# Patient Record
Sex: FEMALE | NOT HISPANIC OR LATINO | URBAN - METROPOLITAN AREA
[De-identification: names, ages, dates, MRNs, and addresses within clinical notes are randomized per-mention and may not be internally consistent; named-entity substitution may affect disease eponyms.]

---

## 2018-05-15 ENCOUNTER — OFFICE VISIT CONVERTED (OUTPATIENT)
Dept: FAMILY MEDICINE CLINIC | Facility: CLINIC | Age: 38
End: 2018-05-15
Attending: NURSE PRACTITIONER

## 2020-07-01 ENCOUNTER — HOSPITAL ENCOUNTER (OUTPATIENT)
Dept: URGENT CARE | Facility: CLINIC | Age: 40
Discharge: HOME | End: 2020-07-01

## 2021-05-16 VITALS
DIASTOLIC BLOOD PRESSURE: 86 MMHG | OXYGEN SATURATION: 98 % | SYSTOLIC BLOOD PRESSURE: 128 MMHG | HEART RATE: 71 BPM | RESPIRATION RATE: 16 BRPM | BODY MASS INDEX: 45.99 KG/M2 | WEIGHT: 293 LBS | TEMPERATURE: 98.4 F | HEIGHT: 67 IN

## 2023-06-17 ENCOUNTER — HOSPITAL ENCOUNTER (EMERGENCY)
Facility: HOSPITAL | Age: 43
Discharge: HOME | End: 2023-06-17
Attending: EMERGENCY MEDICINE
Payer: MEDICAID

## 2023-06-17 ENCOUNTER — APPOINTMENT (OUTPATIENT)
Dept: GENERAL RADIOLOGY | Facility: HOSPITAL | Age: 43
End: 2023-06-17
Payer: MEDICAID

## 2023-06-17 ENCOUNTER — APPOINTMENT (OUTPATIENT)
Dept: CT IMAGING | Facility: HOSPITAL | Age: 43
End: 2023-06-17
Payer: MEDICAID

## 2023-06-17 VITALS
HEIGHT: 67 IN | HEART RATE: 75 BPM | BODY MASS INDEX: 45.95 KG/M2 | RESPIRATION RATE: 18 BRPM | TEMPERATURE: 98.5 F | SYSTOLIC BLOOD PRESSURE: 149 MMHG | DIASTOLIC BLOOD PRESSURE: 76 MMHG | OXYGEN SATURATION: 95 % | WEIGHT: 292.77 LBS

## 2023-06-17 DIAGNOSIS — M25.512 ACUTE PAIN OF LEFT SHOULDER: ICD-10-CM

## 2023-06-17 DIAGNOSIS — G44.309 POST-TRAUMATIC HEADACHE, NOT INTRACTABLE, UNSPECIFIED CHRONICITY PATTERN: ICD-10-CM

## 2023-06-17 DIAGNOSIS — M54.2 NECK PAIN: Primary | ICD-10-CM

## 2023-06-17 PROCEDURE — 70450 CT HEAD/BRAIN W/O DYE: CPT

## 2023-06-17 PROCEDURE — 72050 X-RAY EXAM NECK SPINE 4/5VWS: CPT

## 2023-06-17 RX ORDER — CHOLECALCIFEROL (VITAMIN D3) 125 MCG
500 CAPSULE ORAL DAILY
COMMUNITY

## 2023-06-17 RX ORDER — ACETAMINOPHEN 500 MG
1000 TABLET ORAL ONCE
Status: COMPLETED | OUTPATIENT
Start: 2023-06-17 | End: 2023-06-17

## 2023-06-17 RX ORDER — ERGOCALCIFEROL 1.25 MG/1
50000 CAPSULE ORAL WEEKLY
COMMUNITY

## 2023-06-17 RX ADMIN — ACETAMINOPHEN 1000 MG: 500 TABLET ORAL at 15:55

## 2023-06-17 NOTE — ED PROVIDER NOTES
Emergency Department Encounter    Date seen: 2023  Time: 3:22 PM EDT    Room number: 56/56    Chief Complaint: head and neck pain s/p fall      HPI    History of Present Illness:  Patient is a 43 y.o. year old female who presents to the emergency department for evaluation of fall. Pt was sitting on deck at a local store on when the leg of the chair fell through a hole on the deck.  Patient did not fall completely through the deck.  She reports striking the left side of her head very hard.  She had no LOC but states it did take her a minute to get up.  She presents today with left-sided head pain, neck pain, and left shoulder pain.  She describes her pain as an 8 on a scale of 0-10.    Independent Historian/Clinical History and Information was obtained by:   Patient    History is limited by: N/A      PCP: Provider, No Known        Past Medical History:     No Known Allergies  History reviewed. No pertinent past medical history.  Past Surgical History:   Procedure Laterality Date     SECTION       History reviewed. No pertinent family history.    Home Medications:  Prior to Admission medications    Not on File        Social History:   Social History     Tobacco Use    Smoking status: Never    Smokeless tobacco: Never   Substance Use Topics    Alcohol use: Not Currently    Drug use: Never       All X-rays impressions were independently interpreted by me.  CT scan radiology impression was interpreted by me.      Review of Systems:  Review of Systems   Constitutional:  Negative for chills and fever.   HENT:  Negative for congestion, ear pain and sore throat.    Eyes:  Negative for pain.   Respiratory:  Negative for cough, chest tightness and shortness of breath.    Cardiovascular:  Negative for chest pain.   Gastrointestinal:  Negative for abdominal pain, diarrhea, nausea and vomiting.   Genitourinary:  Negative for flank pain and hematuria.   Musculoskeletal:  Positive for arthralgias (Left shoulder pain  "left hip pain) and neck pain. Negative for joint swelling.   Skin:  Negative for pallor.   Neurological:  Negative for seizures, syncope and headaches.   All other systems reviewed and are negative.     Physical Exam:  /76 (BP Location: Right arm, Patient Position: Sitting)   Pulse 75   Temp 98.5 °F (36.9 °C) (Oral)   Resp 18   Ht 170.2 cm (67\")   Wt 133 kg (292 lb 12.3 oz)   SpO2 95%   BMI 45.85 kg/m²     Physical Exam  Vitals and nursing note reviewed.   Constitutional:       General: She is not in acute distress.     Appearance: Normal appearance. She is not toxic-appearing.   HENT:      Head: Normocephalic and atraumatic.      Mouth/Throat:      Mouth: Mucous membranes are moist.   Eyes:      General: No scleral icterus.  Cardiovascular:      Rate and Rhythm: Normal rate and regular rhythm.      Pulses: Normal pulses.      Heart sounds: Normal heart sounds.   Pulmonary:      Effort: Pulmonary effort is normal. No respiratory distress.      Breath sounds: Normal breath sounds. No stridor. No wheezing, rhonchi or rales.   Chest:      Chest wall: No tenderness.   Abdominal:      General: Abdomen is flat.      Palpations: Abdomen is soft.      Tenderness: There is no abdominal tenderness.   Musculoskeletal:         General: Tenderness (Left shoulder) present. No swelling, deformity or signs of injury. Normal range of motion.      Cervical back: Normal range of motion and neck supple.   Skin:     General: Skin is warm and dry.      Coloration: Skin is not jaundiced or pale.      Findings: Abrasion (Left elbow and knee) present. No bruising, erythema, lesion or rash.      Comments: Abrasion to left elbow and left knee   Neurological:      Mental Status: She is alert and oriented to person, place, and time. Mental status is at baseline.      Sensory: No sensory deficit.                Procedures:  Procedures      Medical Decision Making:      Comorbidities that affect care:    Obesity    External Notes " reviewed:          The following orders were placed and all results were independently analyzed by me:  Orders Placed This Encounter   Procedures    CT Head Without Contrast    XR Spine Cervical Complete 4 or 5 View       Medications Given in the Emergency Department:  Medications   acetaminophen (TYLENOL) tablet 1,000 mg (1,000 mg Oral Given 6/17/23 1555)        ED Course:         Labs:    Lab Results (last 24 hours)       ** No results found for the last 24 hours. **             Imaging:    XR Spine Cervical Complete 4 or 5 View    Result Date: 6/17/2023  PROCEDURE: XR SPINE CERVICAL COMPLETE 4 OR 5 VW  COMPARISON: [facility], CR, C SPINE >OR= 4V OBLIQUE, 7/02/2013, 8:37.  INDICATIONS: fall through deck - pain worse on left side.  FINDINGS:  No fracture or malalignment is identified.  The bony neural foramina appear widely patent bilaterally.  Prevertebral soft tissues appear normal.        1. No acute fracture or malalignment     Haresh Penn M.D.       Electronically Signed and Approved By: Haresh Penn M.D. on 6/17/2023 at 16:20             CT Head Without Contrast    Result Date: 6/17/2023  PROCEDURE: CT HEAD WO CONTRAST  COMPARISON:  [facility], CT, ABDOMEN/PELVIS WITH CONTRAST, 12/27/2017, 2:17. INDICATIONS: fall through deck/HEAD PAIN  PROTOCOL:   Standard imaging protocol performed    RADIATION:   DLP: 954.7 mGy*cm   MA and/or KV was adjusted to minimize radiation dose.     TECHNIQUE: After obtaining the patient's consent, CT images were obtained without non-ionic intravenous contrast material.  FINDINGS:  No focal brain lesion, mass or intracranial hemorrhage is identified.  The ventricles and cisterns are normal in size and configuration.  The visualized extracranial soft tissues appear normal.  Mucosal inflammatory changes are noted in the ethmoid sinuses bilaterally.  No calvarial fracture is identified.        1. No acute intracranial abnormality     Haresh Penn M.D.        Electronically Signed and Approved By: Haresh Penn M.D. on 6/17/2023 at 16:08                Differential Diagnosis and Discussion:    Extremity Pain: Differential diagnosis includes but is not limited to soft tissue sprain, tendonitis, tendon injury, dislocation, fracture, deep vein thrombosis, arterial insufficiency, osteoarthritis, bursitis, and ligamentous damage.  Neck Pain: The patient presents with neck pain. My differential diagnosis includes but is not limited to acute spinal epidural abscess, acute spinal epidural bleed, meningitis, musculoskeletal neck pain, spinal fracture, and osteoarthritis.         Patient Care Considerations:    NARCOTICS: I considered prescribing opiate pain medication as an outpatient, however patient's pain was easily controlled with NSAIDs.  Additionally she was encouraged to use the RICE acronym.      Consultants/Shared Management Plan:    None    Social Determinants of Health:    Patient is independent, reliable, and has access to care.       Disposition and Care Coordination:    Discharged: The patient is suitable and stable for discharge with no need for consideration of observation or admission.    I have explained the patient´s condition, diagnoses and treatment plan based on the information available to me at this time. I have answered questions and addressed any concerns. The patient has a good  understanding of the patient´s diagnosis, condition, and treatment plan as can be expected at this point. The vital signs have been stable. The patient´s condition is stable and appropriate for discharge from the emergency department.      The patient will pursue further outpatient evaluation with the primary care physician or other designated or consulting physician as outlined in the discharge instructions. They are agreeable to this plan of care and follow-up instructions have been explained in detail. The patient has received these instructions in written format and have  expressed an understanding of the discharge instructions. The patient is aware that any significant change in condition or worsening of symptoms should prompt an immediate return to this or the closest emergency department or call to 911.    MDM  Number of Diagnoses or Management Options  Acute pain of left shoulder: new and does not require workup  Neck pain: new and does not require workup  Post-traumatic headache, not intractable, unspecified chronicity pattern: new and does not require workup     Amount and/or Complexity of Data Reviewed  Tests in the radiology section of CPT®: ordered and reviewed    Risk of Complications, Morbidity, and/or Mortality  Presenting problems: low  Diagnostic procedures: low  Management options: low    Patient Progress  Patient progress: stable       Final diagnoses:   Neck pain   Post-traumatic headache, not intractable, unspecified chronicity pattern   Acute pain of left shoulder        ED Disposition       ED Disposition   Discharge    Condition   Stable    Comment   --               This medical record created using voice recognition software.    Documentation assistance provided by Harsh Montenegro, acting as scribe for JANET Cavazos. Information recorded by the scribe was done at my direction and has been verified and validated by me.                    Harsh Montenegro  06/17/23 0950       Anahi Villalpando APRN  06/17/23 0268

## 2023-06-17 NOTE — DISCHARGE INSTRUCTIONS
Please know that your x-rays today were all within normal limits.  It is likely all your pain is muscle soreness.  Please alternate Tylenol and Motrin as needed for your pain.  You may also adhere to the RICE acronym as outlined in your discharge instructions for additional comfort measures.  If it anytime you develop any numbness or tingling to extremities, become unable to ambulate, develop the worst headache of your life please return to the emergency department otherwise follow-up with your primary care provider.

## 2023-07-07 PROBLEM — J30.9 ALLERGIC RHINITIS: Status: ACTIVE | Noted: 2023-07-07

## 2023-07-07 PROBLEM — J45.909 ASTHMA: Status: ACTIVE | Noted: 2023-07-07

## 2023-09-22 ENCOUNTER — OFFICE VISIT (OUTPATIENT)
Dept: FAMILY MEDICINE CLINIC | Facility: CLINIC | Age: 43
End: 2023-09-22
Payer: MEDICAID

## 2023-09-22 VITALS
HEART RATE: 51 BPM | BODY MASS INDEX: 45.99 KG/M2 | TEMPERATURE: 97.7 F | DIASTOLIC BLOOD PRESSURE: 88 MMHG | OXYGEN SATURATION: 97 % | SYSTOLIC BLOOD PRESSURE: 138 MMHG | HEIGHT: 67 IN | WEIGHT: 293 LBS

## 2023-09-22 DIAGNOSIS — Z23 NEED FOR TDAP VACCINATION: ICD-10-CM

## 2023-09-22 DIAGNOSIS — E55.9 VITAMIN D DEFICIENCY: ICD-10-CM

## 2023-09-22 DIAGNOSIS — Z23 NEED FOR INFLUENZA VACCINATION: ICD-10-CM

## 2023-09-22 DIAGNOSIS — Z00.00 ANNUAL PHYSICAL EXAM: Primary | ICD-10-CM

## 2023-09-22 DIAGNOSIS — Z23 NEED FOR PNEUMOCOCCAL VACCINE: ICD-10-CM

## 2023-09-22 DIAGNOSIS — E53.8 B12 DEFICIENCY: ICD-10-CM

## 2023-09-22 DIAGNOSIS — Z76.89 ENCOUNTER FOR SUPPORT AND COORDINATION OF TRANSITION OF CARE: ICD-10-CM

## 2023-09-22 DIAGNOSIS — L30.9 ECZEMA, UNSPECIFIED TYPE: ICD-10-CM

## 2023-09-22 DIAGNOSIS — Z12.31 ENCOUNTER FOR SCREENING MAMMOGRAM FOR MALIGNANT NEOPLASM OF BREAST: ICD-10-CM

## 2023-09-22 DIAGNOSIS — I10 ESSENTIAL HYPERTENSION: ICD-10-CM

## 2023-09-22 DIAGNOSIS — E66.01 CLASS 3 SEVERE OBESITY DUE TO EXCESS CALORIES WITH SERIOUS COMORBIDITY AND BODY MASS INDEX (BMI) OF 45.0 TO 49.9 IN ADULT: ICD-10-CM

## 2023-09-22 RX ORDER — CLOBETASOL PROPIONATE 0.5 MG/ML
LOTION TOPICAL 2 TIMES DAILY
Qty: 118 ML | Refills: 1 | Status: SHIPPED | OUTPATIENT
Start: 2023-09-22 | End: 2023-09-22 | Stop reason: SDUPTHER

## 2023-09-22 RX ORDER — CLOBETASOL PROPIONATE 0.5 MG/G
1 CREAM TOPICAL 2 TIMES DAILY
Qty: 60 G | Refills: 0 | Status: SHIPPED | OUTPATIENT
Start: 2023-09-22

## 2023-09-22 NOTE — PROGRESS NOTES
Chief Complaint  Chief Complaint   Patient presents with    Establish Care     Transfer of care from Delbert GONZALES    Hypertension    Eczema     Pt was given cream at last visit for eczema on elbows, it has not helped    Obesity       Subjective      Jessenia Platt presents to [facility]    The patient is a 43-year-old female who presents today for transition of care from previous primary care clinician and for follow-up on hypertension and eczema.    The patients medical history is significant for vitamin B12 and vitamin D deficiencies, high blood pressure, and possible insulin resistance or prediabetes in the past. She was told in 10/2022, that she was insulin resistant; however, when she was seen by Gama GONZALES, he completed laboratory testing that revealed that she was not insulin resistant. She had made some lifestyle changes following her diagnosis in 10/2022 which she feels may have made the difference. Her most recent hemoglobin A1c was 5.6 percent.     She has received vitamin B12 injections weekly in the past with her most recent being approximately 3 months ago. She was also taking vitamin D supplements weekly in the past as well. She adds that in addition to the injections and weekly supplements, she was also taking drops of both vitamin B12 and vitamin D daily in the past.     Her previous clinician was administering hormone injections once per month in the past. She was also being treated for weight loss, but was unable to lose any weight. She is concerned about her weight due to working out in the gym 5 days per week working on cardio and weight lifting without seeing any results. She has also changed her dietary habits recently. She practices juicing in the morning for her first daily intake and she eats healthy salads throughout the day. She drinks plenty of water throughout the day and has completely cut out all soda intake. She uses [employer] to  help monitor her caloric intake. She feels that there are times that she does not eat enough due to her job, which is what got her started with juicing and protein shakes. She eats dinner at approximately 7:00 PM after work and has been focusing on not eating past that time. She has been practicing meal prep for her dinners. She was prescribed metformin in the past and was able to lose approximately 20 pounds; however, she did not like how the medication made her feel.     She has never undergone a mammogram and she is past due on her annual Pap smear. She denies ever having an abnormal Pap smear result.     She was seen for eczema in the past and was prescribed triamcinolone cream that she does not feel has improved her symptoms. She localizes the majority of her symptoms to her bilateral elbows. She denies itching. She notes that her symptoms began at the age of approximately 38 years of age. The areas in concern with intermittently resolve on its own and then return.     She denies being prescribed medication for blood pressure currently. Her blood pressure today is 138/88 mmHg. She was prescribed medication for this issue several years ago.     Objective     Medical History:  Past Medical History:   Diagnosis Date    Allergic      Past Surgical History:   Procedure Laterality Date     SECTION        Social History     Tobacco Use    Smoking status: Former     Packs/day: 0.10     Years: 10.00     Pack years: 1.00     Types: Cigarettes     Quit date:      Years since quittin.7    Smokeless tobacco: Never   Vaping Use    Vaping Use: Never used   Substance Use Topics    Alcohol use: Not Currently    Drug use: Never     History reviewed. No pertinent family history.    Medications:  Prior to Admission medications    Medication Sig Start Date End Date Taking? Authorizing Provider   triamcinolone (KENALOG) 0.1 % cream Apply 1 application  topically to the appropriate area as directed 2 (Two) Times a  "Day. 7/7/23   Gama Ayers APRN        Allergies:   Patient has no known allergies.    Health Maintenance Due   Topic Date Due    Pneumococcal Vaccine 0-64 (1 - PCV) Never done    TDAP/TD VACCINES (1 - Tdap) Never done    COVID-19 Vaccine (3 - Pfizer series) 06/30/2021    ANNUAL PHYSICAL  Never done    PAP SMEAR  07/07/2023         Vital Signs:   /88 (BP Location: Left arm, Patient Position: Sitting, Cuff Size: Adult)   Pulse 51   Temp 97.7 °F (36.5 °C) (Oral)   Ht 170.2 cm (67\")   Wt 135 kg (298 lb)   SpO2 97%   BMI 46.67 kg/m²     Wt Readings from Last 3 Encounters:   09/22/23 135 kg (298 lb)   07/07/23 132 kg (292 lb)   06/17/23 133 kg (292 lb 12.3 oz)     BP Readings from Last 3 Encounters:   09/22/23 138/88   07/07/23 142/90   06/17/23 149/76     Physical Exam  Vitals reviewed.   Constitutional:       Appearance: Normal appearance. She is well-developed. She is morbidly obese.   HENT:      Head: Normocephalic and atraumatic.   Eyes:      Conjunctiva/sclera: Conjunctivae normal.      Pupils: Pupils are equal, round, and reactive to light.   Cardiovascular:      Rate and Rhythm: Normal rate and regular rhythm.      Heart sounds: No murmur heard.    No friction rub. No gallop.   Pulmonary:      Effort: Pulmonary effort is normal.      Breath sounds: Normal breath sounds. No wheezing or rhonchi.   Abdominal:      General: Bowel sounds are normal. There is no distension.      Palpations: Abdomen is soft.      Tenderness: There is no abdominal tenderness.   Skin:     General: Skin is warm and dry.      Findings: Rash present. Rash is scaling.   Neurological:      Mental Status: She is alert and oriented to person, place, and time.      Cranial Nerves: No cranial nerve deficit.   Psychiatric:         Mood and Affect: Mood and affect normal.         Behavior: Behavior normal.         Thought Content: Thought content normal.         Judgment: Judgment normal.         Result Review :    The following data " was reviewed by JANET Donahue on 09/22/23 at 07:53 EDT:    Common labs          7/7/2023    08:25   Common Labs   Glucose 131    BUN 17    Creatinine 0.63    Sodium 141    Potassium 4.2    Chloride 108    Calcium 9.6    Albumin 4.4    Total Bilirubin 0.3    Alkaline Phosphatase 81    AST (SGOT) 21    ALT (SGPT) 37    WBC 7.35    Hemoglobin 14.4    Hematocrit 42.6    Platelets 235    Total Cholesterol 200    Triglycerides 141    HDL Cholesterol 45    LDL Cholesterol  130    Hemoglobin A1C 5.60      Most Recent A1C          7/7/2023    08:25   HGBA1C Most Recent   Hemoglobin A1C 5.60        XR Spine Cervical Complete 4 or 5 View    Result Date: 6/17/2023    1. No acute fracture or malalignment     Haresh Penn M.D.       Electronically Signed and Approved By: Haresh Penn M.D. on 6/17/2023 at 16:20             CT Head Without Contrast    Result Date: 6/17/2023    1. No acute intracranial abnormality     Haresh Penn M.D.       Electronically Signed and Approved By: Haresh Penn M.D. on 6/17/2023 at 16:08                            Assessment and Plan    Diagnoses and all orders for this visit:    1. Annual physical exam (Primary)    2. Encounter for support and coordination of transition of care    3. Essential hypertension    4. B12 deficiency    5. Vitamin D deficiency    6. Class 3 severe obesity due to excess calories with serious comorbidity and body mass index (BMI) of 45.0 to 49.9 in adult    7. Eczema, unspecified type  -     clobetasol (CLOBEX) 0.05 % lotion; Apply  topically to the appropriate area as directed 2 (Two) Times a Day.  Dispense: 118 mL; Refill: 1    8. Encounter for screening mammogram for malignant neoplasm of breast  -     Mammo Screening Digital Tomosynthesis Bilateral With CAD; Future    9. Need for pneumococcal vaccine  -     Cancel: Pneumococcal Conjugate Vaccine 20-Valent (PCV20)  -     Pneumococcal Conjugate Vaccine 20-Valent (PCV20); Future    10. Need for influenza  vaccination  -     Fluzone >6 Months ([id number])    11. Need for Tdap vaccination  -     Tdap Vaccine => 6yo IM (BOOSTRIX)       Obesity  - We spent time discussing her dietary and exercise habits and routine. I recommended she keep a consistent caloric intake log throughout the day. We discussed the possibility of restarting metformin if she feels that would be helpful to her.     General medical examination  - I have placed an order for a mammogram screening. Influenza and Tdap vaccines administered today. She will schedule a follow-up examination in 3 months. She will consider completing her annual Pap smear at that appointment.     Eczema  - I have sent in a prescription for clobetasol lotion for the patient to apply to the affected areas 2 times a day as needed.         Smoking Cessation:    Jessenia Platt  reports that she quit smoking about 21 years ago. Her smoking use included cigarettes. She has a 1.00 pack-year smoking history. She has never used smokeless tobacco.    Follow Up   Return in about 3 months (around 12/22/2023) for Next scheduled follow up, [facility] with Pap.  Patient was given instructions and counseling regarding her condition or for health maintenance advice. Please see specific information pulled into the AVS if appropriate.     Please note that portions of this note were completed with a voice recognition program.    Transcribed from ambient dictation for JANET Donahue by Filomena Jones.  09/22/23   10:18 EDT    Patient or patient representative verbalized consent to the visit recording.  I have personally performed the services described in this document as transcribed by the above individual, and it is both accurate and complete.

## 2023-11-22 ENCOUNTER — HOSPITAL ENCOUNTER (EMERGENCY)
Facility: HOSPITAL | Age: 43
Discharge: HOME | End: 2023-11-22
Attending: EMERGENCY MEDICINE | Admitting: EMERGENCY MEDICINE
Payer: MEDICAID

## 2023-11-22 VITALS
TEMPERATURE: 98.1 F | SYSTOLIC BLOOD PRESSURE: 155 MMHG | HEIGHT: 67 IN | RESPIRATION RATE: 20 BRPM | WEIGHT: 293 LBS | OXYGEN SATURATION: 98 % | HEART RATE: 55 BPM | BODY MASS INDEX: 45.99 KG/M2 | DIASTOLIC BLOOD PRESSURE: 77 MMHG

## 2023-11-22 DIAGNOSIS — J02.9 ACUTE VIRAL PHARYNGITIS: Primary | ICD-10-CM

## 2023-11-22 DIAGNOSIS — I10 UNCONTROLLED HYPERTENSION: ICD-10-CM

## 2023-11-22 LAB
FLUAV SUBTYP SPEC NAA+PROBE: NOT DETECTED
FLUBV RNA ISLT QL NAA+PROBE: NOT DETECTED
HETEROPH AB SER QL LA: NEGATIVE
RSV RNA NPH QL NAA+NON-PROBE: NOT DETECTED
S PYO AG THROAT QL: NEGATIVE
SARS-COV-2 RNA RESP QL NAA+PROBE: NOT DETECTED

## 2023-11-22 PROCEDURE — 99283 EMERGENCY DEPT VISIT LOW MDM: CPT

## 2023-11-22 PROCEDURE — 87637 SARSCOV2&INF A&B&RSV AMP PRB: CPT | Performed by: EMERGENCY MEDICINE

## 2023-11-22 PROCEDURE — 87880 STREP A ASSAY W/OPTIC: CPT | Performed by: EMERGENCY MEDICINE

## 2023-11-22 PROCEDURE — 36415 COLL VENOUS BLD VENIPUNCTURE: CPT

## 2023-11-22 PROCEDURE — 87081 CULTURE SCREEN ONLY: CPT | Performed by: EMERGENCY MEDICINE

## 2023-11-22 PROCEDURE — 86308 HETEROPHILE ANTIBODY SCREEN: CPT | Performed by: EMERGENCY MEDICINE

## 2023-11-22 RX ORDER — IBUPROFEN 400 MG/1
800 TABLET ORAL ONCE
Status: COMPLETED | OUTPATIENT
Start: 2023-11-22 | End: 2023-11-22

## 2023-11-22 RX ADMIN — IBUPROFEN 800 MG: 400 TABLET, FILM COATED ORAL at 07:17

## 2023-11-22 NOTE — ED PROVIDER NOTES
Time: 6:15 AM EST  Date of encounter:  2023  Independent Historian/Clinical History and Information was obtained by:   Patient    History is limited by: N/A    Chief Complaint: Sore throat, ear ache      History of Present Illness:  Patient is a 43 y.o. year old female who presents to the emergency department for evaluation of moderately severe sore throat and bilateral earache recently over the past couple days.    She denies any fever or cough or headache or chills.    She denies any known sick contacts with COVID-19.    She does have some generalized malaise and some bodyaches though and feels rundown the last couple days.    HPI    Patient Care Team  Primary Care Provider: Maris Gordillo APRN    Past Medical History:   Hypertension, eczema  No Known Allergies  Past Medical History:   Diagnosis Date    Allergic      Past Surgical History:   Procedure Laterality Date     SECTION       History reviewed. No pertinent family history.    Home Medications:  Prior to Admission medications    Medication Sig Start Date End Date Taking? Authorizing Provider   clobetasol prop emollient base (Clobetasol Propionate E) 0.05 % emollient cream Apply 1 application  topically to the appropriate area as directed 2 (Two) Times a Day. 23   Maris Gordillo APRN   triamcinolone (KENALOG) 0.1 % cream Apply 1 application  topically to the appropriate area as directed 2 (Two) Times a Day. 23   Gama Ayers APRN        Social History:   Social History     Tobacco Use    Smoking status: Former     Packs/day: 0.10     Years: 10.00     Additional pack years: 0.00     Total pack years: 1.00     Types: Cigarettes     Quit date:      Years since quittin.9    Smokeless tobacco: Never   Vaping Use    Vaping Use: Never used   Substance Use Topics    Alcohol use: Not Currently    Drug use: Never         Review of Systems:  Review of Systems     I performed a 10 point review of systems which was  "all negative, except for the positives found in the HPI above.        Physical Exam:  BP (!) 182/98 (BP Location: Left arm, Patient Position: Sitting)   Pulse 68   Temp 98.1 °F (36.7 °C) (Oral)   Resp 20   Ht 170.2 cm (67\")   Wt 133 kg (294 lb 1.5 oz)   SpO2 98%   BMI 46.06 kg/m²       Physical Exam   General: Awake alert and in mild distress due to throat pain    HEENT: Head normocephalic atraumatic, eyes PERRLA EOMI, nose normal, oropharynx notable for mild posterior pharynx erythema bilaterally but no tonsillar enlargement or exudates or uvular midline shift or ulcerations.  Voice sounds somewhat hoarse.  Bilateral ear exam showed normal canals and normal TMs without erythema or bulging or drainage.    Neck: Supple full range of motion, no meningismus, minimal bilateral anterior cervical lymphadenopathy    Heart: Regular rate and rhythm, no murmurs or rubs, 2+ radial pulses bilaterally    Lungs: Clear to auscultation bilaterally without wheezes or crackles, no respiratory distress    Abdomen: Soft, nontender, nondistended, no rebound or guarding    Skin: Warm, dry, no rash    Musculoskeletal: Normal range of motion, no lower extremity edema    Neurologic: Oriented x3, no motor deficits no sensory deficits    Psychiatric: Mood appears stable, no psychosis          Procedures:  Procedures      Medical Decision Making:      Comorbidities that affect care:    Hypertension    External Notes reviewed:    Previous Clinic Note: I reviewed her most recent primary care clinic note where she was being managed for hypertension and obesity and eczema      The following orders were placed and all results were independently analyzed by me:  Orders Placed This Encounter   Procedures    COVID-19, FLU A/B, RSV PCR 1 HR TAT - Swab, Nasopharynx    Rapid Strep A Screen - Swab, Throat    Beta Strep Culture, Throat - Swab, Throat    Mononucleosis Screen       Medications Given in the Emergency Department:  Medications "   ibuprofen (ADVIL,MOTRIN) tablet 800 mg (800 mg Oral Given 11/22/23 0717)        ED Course:         Labs:    Lab Results (last 24 hours)       Procedure Component Value Units Date/Time    COVID-19, FLU A/B, RSV PCR 1 HR TAT - Swab, Nasopharynx [[id number]]  (Normal) Collected: 11/22/23 0609    Specimen: Swab from Nasopharynx Updated: 11/22/23 0705     COVID19 Not Detected     Influenza A PCR Not Detected     Influenza B PCR Not Detected     RSV, PCR Not Detected    Narrative:      Fact sheet for providers: https://www.fda.gov/media/892908/download    Fact sheet for patients: https://www.fda.gov/media/206212/download    Test performed by PCR.    Rapid Strep A Screen - Swab, Throat [[id number]]  (Normal) Collected: 11/22/23 0609    Specimen: Swab from Throat Updated: 11/22/23 0648     Strep A Ag Negative    Beta Strep Culture, Throat - Swab, Throat [[id number]] Collected: 11/22/23 0609    Specimen: Swab from Throat Updated: 11/22/23 0648    Mononucleosis Screen [[id number]]  (Normal) Collected: 11/22/23 0723    Specimen: Blood Updated: 11/22/23 0747     Monospot Negative             Imaging:    No Radiology Exams Resulted Within Past 24 Hours      Differential Diagnosis and Discussion:    Sore Throat: Differential diagnosis includes but is not limited to bacterial infection, viral infection, inhaled irritants, sinus drainage, thyroiditis, epiglottitis, and retropharyngeal abscess.    All labs were reviewed and interpreted by me.    MDM     Amount and/or Complexity of Data Reviewed  Clinical lab tests: reviewed               This patient is a 43-year-old female presenting with acute onset of sore throat and earache.    Vital signs are stable except blood pressure is poorly controlled, but she does have a history of hypertension.    I am swabbing her for strep throat and also COVID and flu virus, and will send blood work for Monospot testing.    All of her swabs for COVID and flu and RSV and strep throat and even her  monotest came back negative today, so I presume she has a self-limiting viral pharyngitis.      I do not see any airway involvement or any likelihood of peritonsillar abscess or deep space infection in the neck and I think she probably has a self-limiting viral pharyngitis that can be managed safely as an outpatient with supportive care instructions like NSAIDs, salt water gargle, Cepacol lozenges.                Patient Care Considerations:    ANTIBIOTICS: I considered prescribing antibiotics as an outpatient however she appears to have a viral pharyngitis that should be self-limiting      Consultants/Shared Management Plan:        Social Determinants of Health:    Patient is independent, reliable, and has access to care.       Disposition and Care Coordination:    Discharged: The patient is suitable and stable for discharge with no need for consideration of observation or admission.    I have explained the patient´s condition, diagnoses and treatment plan based on the information available to me at this time. I have answered questions and addressed any concerns. The patient has a good  understanding of the patient´s diagnosis, condition, and treatment plan as can be expected at this point. The vital signs have been stable. The patient´s condition is stable and appropriate for discharge from the emergency department.      The patient will pursue further outpatient evaluation with the primary care physician or other designated or consulting physician as outlined in the discharge instructions. They are agreeable to this plan of care and follow-up instructions have been explained in detail. The patient has received these instructions in written format and have expressed an understanding of the discharge instructions. The patient is aware that any significant change in condition or worsening of symptoms should prompt an immediate return to this or the closest emergency department or call to 911.  I have explained  discharge medications and the need for follow up with the patient/caretakers. This was also printed in the discharge instructions. Patient was discharged with the following medications and follow up:      Medication List      No changes were made to your prescriptions during this visit.      Maris Gordillo, APRN  [address] [address]  [phone]    Call in 3 days  As needed, If symptoms worsen, for a follow-up appointment       Final diagnoses:   Acute viral pharyngitis   Uncontrolled hypertension        ED Disposition       ED Disposition   Discharge    Condition   Stable    Comment   --               This medical record created using voice recognition software.             Meliton Johnson MD  11/22/23 07

## 2023-11-22 NOTE — DISCHARGE INSTRUCTIONS
Your COVID-19 and flu swabs and strep throat swabs all came back negative today.      It looks like you have a self-limiting viral pharyngitis causing your sore throat that just needs to run its course this week, so plan on using salt water gargles, over-the-counter Cepacol numbing throat lozenges as needed, ibuprofen alternated with Tylenol for pain, plenty of fluids and rest.

## 2023-11-24 LAB — BACTERIA SPEC AEROBE CULT: NORMAL

## 2023-12-22 ENCOUNTER — OFFICE VISIT (OUTPATIENT)
Dept: FAMILY MEDICINE CLINIC | Facility: CLINIC | Age: 43
End: 2023-12-22
Payer: MEDICAID

## 2023-12-22 VITALS
WEIGHT: 293 LBS | OXYGEN SATURATION: 99 % | BODY MASS INDEX: 45.99 KG/M2 | HEIGHT: 67 IN | TEMPERATURE: 97.9 F | DIASTOLIC BLOOD PRESSURE: 86 MMHG | HEART RATE: 81 BPM | SYSTOLIC BLOOD PRESSURE: 132 MMHG

## 2023-12-22 DIAGNOSIS — N89.8 VAGINAL DISCHARGE: ICD-10-CM

## 2023-12-22 DIAGNOSIS — Z01.419 ENCOUNTER FOR ROUTINE GYNECOLOGICAL EXAMINATION WITH PAPANICOLAOU SMEAR OF CERVIX: Primary | ICD-10-CM

## 2023-12-22 DIAGNOSIS — Z23 NEED FOR PNEUMOCOCCAL 20-VALENT CONJUGATE VACCINATION: ICD-10-CM

## 2023-12-22 DIAGNOSIS — Z11.3 SCREEN FOR STD (SEXUALLY TRANSMITTED DISEASE): ICD-10-CM

## 2023-12-22 LAB
C TRACH RRNA CVX QL NAA+PROBE: NOT DETECTED
CANDIDA SPECIES: NEGATIVE
GARDNERELLA VAGINALIS: POSITIVE
N GONORRHOEA RRNA SPEC QL NAA+PROBE: NOT DETECTED
T VAGINALIS DNA VAG QL PROBE+SIG AMP: NEGATIVE

## 2023-12-22 PROCEDURE — 87660 TRICHOMONAS VAGIN DIR PROBE: CPT

## 2023-12-22 PROCEDURE — 87591 N.GONORRHOEAE DNA AMP PROB: CPT

## 2023-12-22 PROCEDURE — 87510 GARDNER VAG DNA DIR PROBE: CPT

## 2023-12-22 PROCEDURE — 87480 CANDIDA DNA DIR PROBE: CPT

## 2023-12-22 PROCEDURE — G0123 SCREEN CERV/VAG THIN LAYER: HCPCS

## 2023-12-22 PROCEDURE — 87624 HPV HI-RISK TYP POOLED RSLT: CPT

## 2023-12-22 PROCEDURE — 87491 CHLMYD TRACH DNA AMP PROBE: CPT

## 2023-12-22 NOTE — PROGRESS NOTES
"  ENCOUNTER DATE:  2023    Jessenia Lc / 43 y.o. / female      CHIEF COMPLAINT     Gynecologic Exam      VITALS     Visit Vitals  /86   Pulse 81   Temp 97.9 °F (36.6 °C)   Ht 170.2 cm (67\")   Wt 136 kg (299 lb 9.6 oz)   SpO2 99%   BMI 46.92 kg/m²       BP Readings from Last 3 Encounters:   23 132/86   23 155/77   23 138/88     Wt Readings from Last 3 Encounters:   23 136 kg (299 lb 9.6 oz)   23 133 kg (294 lb 1.5 oz)   23 135 kg (298 lb)      Body mass index is 46.92 kg/m².    MEDICATIONS     Current Outpatient Medications on File Prior to Visit   Medication Sig Dispense Refill    clobetasol prop emollient base (Clobetasol Propionate E) 0.05 % emollient cream Apply 1 application  topically to the appropriate area as directed 2 (Two) Times a Day. 60 g 0    triamcinolone (KENALOG) 0.1 % cream Apply 1 application  topically to the appropriate area as directed 2 (Two) Times a Day. 45 g 1     No current facility-administered medications on file prior to visit.         HISTORY OF PRESENT ILLNESS     Jessenia presents for annual health maintenance visit.    Obstetric History:  OB History    No obstetric history on file.        Menstrual History:     No LMP recorded. Patient has had an ablation.         No results found for: \"HPVAPTIMA\"    The patient is a 43-year-old female who comes in today for well-woman exam with Pap smear.    Her last Pap smear was probably 5 years ago. She has never had an abnormal Pap smear. She has never had a mammogram. She denies any vaginal discharge, itching, sores, or ulcers. She is interested in STD screening because she and her  are . She has never been tested or treated for STDs in the past.      - Depression Screenin/22/2023     8:24 AM   PHQ-2/PHQ-9 Depression Screening   Little Interest or Pleasure in Doing Things 0-->not at all   Feeling Down, Depressed or Hopeless 0-->not at all   PHQ-9: Brief Depression " Severity Measure Score 0         PHQ-2: 0 (Not depressed)   PHQ-9: 0 (Negative screening for depression)    Patient Care Team:  Maris Gordillo APRN as PCP - General (Family Medicine)      ALLERGIES  No Known Allergies     PFSH:     The following portions of the patient's history were reviewed and updated as appropriate: Allergies / Current Medications / Past Medical History / Surgical History / Social History / Family History    PROBLEM LIST   Patient Active Problem List   Diagnosis    Vitamin B12 deficiency    Essential hypertension    Asthma    Allergic rhinitis       PAST MEDICAL HISTORY  Past Medical History:   Diagnosis Date    Allergic        SURGICAL HISTORY  Past Surgical History:   Procedure Laterality Date     SECTION         SOCIAL HISTORY  Social History     Socioeconomic History    Marital status:    Tobacco Use    Smoking status: Former     Packs/day: 0.10     Years: 10.00     Additional pack years: 0.00     Total pack years: 1.00     Types: Cigarettes     Quit date:      Years since quittin.9    Smokeless tobacco: Never   Vaping Use    Vaping Use: Never used   Substance and Sexual Activity    Alcohol use: Not Currently    Drug use: Never    Sexual activity: Defer       FAMILY HISTORY  No family history on file.    IMMUNIZATION HISTORY  Immunization History   Administered Date(s) Administered    COVID-19 (PFIZER) Purple Cap Monovalent 2021, 2021    Fluzone (or Fluarix & Flulaval for VFC) >6mos 2023    Tdap 2023         PHYSICAL EXAMINATION     Physical Exam  Vitals reviewed. Exam conducted with a chaperone present.   Constitutional:       General: She is not in acute distress.     Appearance: Normal appearance. She is well-developed. She is not diaphoretic.   HENT:      Head: Normocephalic and atraumatic. Hair is normal.      Right Ear: Hearing normal. No decreased hearing noted. No drainage.      Left Ear: Hearing normal. No decreased hearing  noted.      Nose: Nose normal. No nasal deformity.      Mouth/Throat:      Mouth: Mucous membranes are moist.   Eyes:      General: Lids are normal.      Conjunctiva/sclera: Conjunctivae normal.      Pupils: Pupils are equal, round, and reactive to light.   Neck:      Thyroid: No thyromegaly.      Vascular: No JVD.   Cardiovascular:      Rate and Rhythm: Normal rate and regular rhythm.      Pulses: Normal pulses.      Heart sounds: Normal heart sounds. No murmur heard.     No friction rub. No gallop.   Pulmonary:      Effort: Pulmonary effort is normal. No respiratory distress.      Breath sounds: Normal breath sounds. No wheezing or rales.   Chest:      Chest wall: No tenderness.   Breasts:     Breasts are symmetrical.      Right: Normal. No mass, nipple discharge, skin change or tenderness.      Left: Normal. No mass, nipple discharge, skin change or tenderness.   Abdominal:      General: Bowel sounds are normal. There is no distension.      Palpations: Abdomen is soft. There is no mass.      Tenderness: There is no abdominal tenderness. There is no guarding or rebound.      Hernia: No hernia is present.   Genitourinary:     General: Normal vulva.      Pubic Area: No rash.       Labia:         Right: No rash or lesion.         Left: No rash or lesion.       Urethra: No urethral swelling.      Vagina: Normal. Vaginal discharge present. No lesions.      Cervix: No discharge or cervical bleeding.      Uterus: Normal. Not tender.       Adnexa: Right adnexa normal and left adnexa normal.        Right: No tenderness.          Left: No tenderness.        Rectum: Normal.   Musculoskeletal:         General: No tenderness or deformity. Normal range of motion.      Cervical back: Normal range of motion and neck supple.   Lymphadenopathy:      Cervical: No cervical adenopathy.   Skin:     General: Skin is warm and dry.      Findings: Erythema present. No rash.   Neurological:      Mental Status: She is alert and oriented to  "person, place, and time.      Cranial Nerves: No cranial nerve deficit.      Motor: No abnormal muscle tone.      Coordination: Coordination normal.      Deep Tendon Reflexes: Reflexes are normal and symmetric.   Psychiatric:         Mood and Affect: Mood normal.         Behavior: Behavior normal.         Thought Content: Thought content normal.         Judgment: Judgment normal.         REVIEWED DATA      Labs:    Lab Results   Component Value Date     07/07/2023    K 4.2 07/07/2023    CALCIUM 9.6 07/07/2023    AST 21 07/07/2023    ALT 37 (H) 07/07/2023    BUN 17 07/07/2023    CREATININE 0.63 07/07/2023       Lab Results   Component Value Date    HGBA1C 5.60 07/07/2023    TSH 1.380 07/07/2023       Lab Results   Component Value Date     (H) 07/07/2023    HDL 45 07/07/2023    TRIG 141 07/07/2023       Lab Results   Component Value Date    IDLG59TS 47.4 07/07/2023        Lab Results   Component Value Date    WBC 7.35 07/07/2023    HGB 14.4 07/07/2023    MCV 87.7 07/07/2023     07/07/2023       No results found for: \"PROTEIN\", \"GLUCOSEU\", \"BLOODU\", \"NITRITEU\", \"LEUKOCYTESUR\"     No results found for: \"HEPCVIRUSABY\"          ASSESSMENT & PLAN     ANNUAL WELLNESS EXAM / PHYSICAL  Diagnoses and all orders for this visit:    1. Encounter for routine gynecological examination with Papanicolaou smear of cervix (Primary)  -     IgP, Aptima HPV    2. Vaginal discharge  -     Gardnerella vaginalis, Trichomonas vaginalis, Candida albicans, DNA - Swab, Vagina  -     Chlamydia trachomatis, Neisseria gonorrhoeae, PCR - , Cervix    3. Screen for STD (sexually transmitted disease)  -     Gardnerella vaginalis, Trichomonas vaginalis, Candida albicans, DNA - Swab, Vagina  -     Chlamydia trachomatis, Neisseria gonorrhoeae, PCR - , Cervix      Well-woman exam with Pap smear.  - Pap smear performed today.   - Breast exam performed today.    HEALTHCARE MAINTENANCE ISSUES     Cancer Screening:  Colon: Initial/Next " screening at age: 46  Repeat colon cancer screening: N/A at this time  Breast: Recommended monthly self exams; annual professional exam  Mammogram: every 1 year  Cervical: 3 years  Skin: Monthly self skin examination, annual exam by health professional      Lifestyle Counseling:  Lifestyle Modifications: Attempt to lose weight, Improve dietary compliance, Begin progressive aerobic exercise program 3-5 days a week, Continue to abstain/curtail drinking, Continue to abstain from smoking, and Reduce exposure to stress if possible  Safety Issues: Always wear seatbelt, Avoid texting while driving   Use sunscreen, regular skin examination  Recommended annual dental/vision examination.  Emotional/Stress/Sleep: Reviewed and  given when appropriate      Transcribed from ambient dictation for JANET Donahue by Nellie Roberts.  12/22/23   09:56 EST    Patient or patient representative verbalized consent to the visit recording.  I have personally performed the services described in this document as transcribed by the above individual, and it is both accurate and complete.

## 2023-12-28 LAB
CYTOLOGIST CVX/VAG CYTO: NORMAL
CYTOLOGY CVX/VAG DOC CYTO: NORMAL
CYTOLOGY CVX/VAG DOC THIN PREP: NORMAL
DX ICD CODE: NORMAL
HIV 1 & 2 AB SER-IMP: NORMAL
HPV I/H RISK 4 DNA CVX QL PROBE+SIG AMP: NEGATIVE
OTHER STN SPEC: NORMAL
STAT OF ADQ CVX/VAG CYTO-IMP: NORMAL

## 2024-01-17 RX ORDER — NAPROXEN 500 MG/1
500 TABLET ORAL 2 TIMES DAILY WITH MEALS
Qty: 30 TABLET | Refills: 1 | Status: SHIPPED | OUTPATIENT
Start: 2024-01-17

## 2024-01-19 ENCOUNTER — APPOINTMENT (OUTPATIENT)
Dept: ULTRASOUND IMAGING | Facility: HOSPITAL | Age: 44
End: 2024-01-19
Payer: MEDICAID

## 2024-01-19 ENCOUNTER — HOSPITAL ENCOUNTER (EMERGENCY)
Facility: HOSPITAL | Age: 44
Discharge: HOME | End: 2024-01-19
Attending: EMERGENCY MEDICINE
Payer: MEDICAID

## 2024-01-19 VITALS
WEIGHT: 293 LBS | SYSTOLIC BLOOD PRESSURE: 164 MMHG | BODY MASS INDEX: 45.99 KG/M2 | HEART RATE: 82 BPM | HEIGHT: 67 IN | OXYGEN SATURATION: 96 % | DIASTOLIC BLOOD PRESSURE: 109 MMHG | RESPIRATION RATE: 18 BRPM | TEMPERATURE: 98.7 F

## 2024-01-19 DIAGNOSIS — D25.9 UTERINE LEIOMYOMA, UNSPECIFIED LOCATION: ICD-10-CM

## 2024-01-19 DIAGNOSIS — R10.2 PELVIC PAIN: Primary | ICD-10-CM

## 2024-01-19 LAB
ALBUMIN SERPL-MCNC: 4.3 G/DL (ref 3.5–5.2)
ALBUMIN/GLOB SERPL: 1.3 G/DL
ALP SERPL-CCNC: 78 U/L (ref 39–117)
ALT SERPL W P-5'-P-CCNC: 48 U/L (ref 1–33)
ANION GAP SERPL CALCULATED.3IONS-SCNC: 12.3 MMOL/L (ref 5–15)
AST SERPL-CCNC: 31 U/L (ref 1–32)
BASOPHILS # BLD AUTO: 0.04 10*3/MM3 (ref 0–0.2)
BASOPHILS NFR BLD AUTO: 0.5 % (ref 0–1.5)
BILIRUB SERPL-MCNC: 0.3 MG/DL (ref 0–1.2)
BILIRUB UR QL STRIP: NEGATIVE
BUN SERPL-MCNC: 9 MG/DL (ref 6–20)
BUN/CREAT SERPL: 15.5 (ref 7–25)
CALCIUM SPEC-SCNC: 9.8 MG/DL (ref 8.6–10.5)
CHLORIDE SERPL-SCNC: 104 MMOL/L (ref 98–107)
CLARITY UR: CLEAR
CO2 SERPL-SCNC: 23.7 MMOL/L (ref 22–29)
COLOR UR: YELLOW
CREAT SERPL-MCNC: 0.58 MG/DL (ref 0.57–1)
DEPRECATED RDW RBC AUTO: 39.3 FL (ref 37–54)
EGFRCR SERPLBLD CKD-EPI 2021: 115.3 ML/MIN/1.73
EOSINOPHIL # BLD AUTO: 0.22 10*3/MM3 (ref 0–0.4)
EOSINOPHIL NFR BLD AUTO: 2.8 % (ref 0.3–6.2)
ERYTHROCYTE [DISTWIDTH] IN BLOOD BY AUTOMATED COUNT: 12.3 % (ref 12.3–15.4)
GLOBULIN UR ELPH-MCNC: 3.4 GM/DL
GLUCOSE SERPL-MCNC: 132 MG/DL (ref 65–99)
GLUCOSE UR STRIP-MCNC: NEGATIVE MG/DL
HCG INTACT+B SERPL-ACNC: <0.5 MIU/ML
HCT VFR BLD AUTO: 41.4 % (ref 34–46.6)
HGB BLD-MCNC: 13.9 G/DL (ref 12–15.9)
HGB UR QL STRIP.AUTO: NEGATIVE
HOLD SPECIMEN: NORMAL
HOLD SPECIMEN: NORMAL
IMM GRANULOCYTES # BLD AUTO: 0.02 10*3/MM3 (ref 0–0.05)
IMM GRANULOCYTES NFR BLD AUTO: 0.3 % (ref 0–0.5)
KETONES UR QL STRIP: NEGATIVE
LEUKOCYTE ESTERASE UR QL STRIP.AUTO: NEGATIVE
LIPASE SERPL-CCNC: 31 U/L (ref 13–60)
LYMPHOCYTES # BLD AUTO: 2.55 10*3/MM3 (ref 0.7–3.1)
LYMPHOCYTES NFR BLD AUTO: 33 % (ref 19.6–45.3)
MCH RBC QN AUTO: 29.6 PG (ref 26.6–33)
MCHC RBC AUTO-ENTMCNC: 33.6 G/DL (ref 31.5–35.7)
MCV RBC AUTO: 88.3 FL (ref 79–97)
MONOCYTES # BLD AUTO: 0.36 10*3/MM3 (ref 0.1–0.9)
MONOCYTES NFR BLD AUTO: 4.7 % (ref 5–12)
NEUTROPHILS NFR BLD AUTO: 4.54 10*3/MM3 (ref 1.7–7)
NEUTROPHILS NFR BLD AUTO: 58.7 % (ref 42.7–76)
NITRITE UR QL STRIP: NEGATIVE
NRBC BLD AUTO-RTO: 0 /100 WBC (ref 0–0.2)
PH UR STRIP.AUTO: 7 [PH] (ref 5–8)
PLATELET # BLD AUTO: 241 10*3/MM3 (ref 140–450)
PMV BLD AUTO: 11 FL (ref 6–12)
POTASSIUM SERPL-SCNC: 3.7 MMOL/L (ref 3.5–5.2)
PROT SERPL-MCNC: 7.7 G/DL (ref 6–8.5)
PROT UR QL STRIP: NEGATIVE
RBC # BLD AUTO: 4.69 10*6/MM3 (ref 3.77–5.28)
SODIUM SERPL-SCNC: 140 MMOL/L (ref 136–145)
SP GR UR STRIP: 1.01 (ref 1–1.03)
UROBILINOGEN UR QL STRIP: NORMAL
WBC NRBC COR # BLD AUTO: 7.73 10*3/MM3 (ref 3.4–10.8)
WHOLE BLOOD HOLD COAG: NORMAL
WHOLE BLOOD HOLD SPECIMEN: NORMAL

## 2024-01-19 PROCEDURE — 83690 ASSAY OF LIPASE: CPT | Performed by: EMERGENCY MEDICINE

## 2024-01-19 PROCEDURE — 99284 EMERGENCY DEPT VISIT MOD MDM: CPT

## 2024-01-19 PROCEDURE — 36415 COLL VENOUS BLD VENIPUNCTURE: CPT

## 2024-01-19 PROCEDURE — 84702 CHORIONIC GONADOTROPIN TEST: CPT | Performed by: EMERGENCY MEDICINE

## 2024-01-19 PROCEDURE — 81003 URINALYSIS AUTO W/O SCOPE: CPT | Performed by: EMERGENCY MEDICINE

## 2024-01-19 PROCEDURE — 76830 TRANSVAGINAL US NON-OB: CPT

## 2024-01-19 PROCEDURE — 80053 COMPREHEN METABOLIC PANEL: CPT | Performed by: EMERGENCY MEDICINE

## 2024-01-19 PROCEDURE — 85025 COMPLETE CBC W/AUTO DIFF WBC: CPT | Performed by: EMERGENCY MEDICINE

## 2024-01-19 RX ORDER — SODIUM CHLORIDE 0.9 % (FLUSH) 0.9 %
10 SYRINGE (ML) INJECTION AS NEEDED
Status: DISCONTINUED | OUTPATIENT
Start: 2024-01-19 | End: 2024-01-19 | Stop reason: HOSPADM

## 2024-01-19 NOTE — ED PROVIDER NOTES
Time: 2:14 PM EST  Date of encounter:  2024  Independent Historian/Clinical History and Information was obtained by:   Patient    History is limited by: N/A    Chief Complaint: pelvic pain      History of Present Illness:  Patient is a 44 y.o. year old female who presents to the emergency department for evaluation of pelvic pain since having a pap smear on 23. She says she contacted her PCP and was given metronidazole for BV (from pap result) and naproxen to take for pain on 24. She says the naproxen does not help. She denies dysuria, vaginal itching/burning/discharge.    HPI    Patient Care Team  Primary Care Provider: Maris Gordillo APRN    Past Medical History:     No Known Allergies  Past Medical History:   Diagnosis Date    Allergic      Past Surgical History:   Procedure Laterality Date     SECTION       History reviewed. No pertinent family history.    Home Medications:  Prior to Admission medications    Medication Sig Start Date End Date Taking? Authorizing Provider   clobetasol prop emollient base (Clobetasol Propionate E) 0.05 % emollient cream Apply 1 application  topically to the appropriate area as directed 2 (Two) Times a Day. 23   Maris Grodillo APRN   lactobacillus (BACID) tablet caplet Take 1 tablet by mouth 2 (Two) Times a Day for 30 days. 24  Maris Gordillo APRN   naproxen (Naprosyn) 500 MG tablet Take 1 tablet by mouth 2 (Two) Times a Day With Meals. 24   Maris Gordillo APRN   triamcinolone (KENALOG) 0.1 % cream Apply 1 application  topically to the appropriate area as directed 2 (Two) Times a Day. 23   Gama Ayers APRN        Social History:   Social History     Tobacco Use    Smoking status: Former     Packs/day: 0.10     Years: 10.00     Additional pack years: 0.00     Total pack years: 1.00     Types: Cigarettes     Quit date:      Years since quittin.0    Smokeless tobacco: Never   Vaping Use  "   Vaping Use: Never used   Substance Use Topics    Alcohol use: Not Currently    Drug use: Never         Review of Systems:  Review of Systems   Constitutional: Negative.    HENT: Negative.     Eyes: Negative.    Respiratory: Negative.     Cardiovascular: Negative.    Gastrointestinal: Negative.    Endocrine: Negative.    Genitourinary:  Positive for pelvic pain.   Musculoskeletal: Negative.    Skin: Negative.    Allergic/Immunologic: Negative.    Neurological: Negative.    Hematological: Negative.    Psychiatric/Behavioral: Negative.          Physical Exam:  BP (!) 164/109 (BP Location: Right arm, Patient Position: Sitting)   Pulse 82   Temp 98.7 °F (37.1 °C) (Oral)   Resp 18   Ht 170.2 cm (67\")   Wt (!) 139 kg (306 lb 3.5 oz)   SpO2 96%   BMI 47.96 kg/m²     Physical Exam  Constitutional:       Appearance: Normal appearance.   HENT:      Head: Normocephalic and atraumatic.      Nose: Nose normal.      Mouth/Throat:      Mouth: Mucous membranes are moist.   Eyes:      Pupils: Pupils are equal, round, and reactive to light.   Cardiovascular:      Rate and Rhythm: Normal rate and regular rhythm.      Pulses: Normal pulses.   Pulmonary:      Effort: Pulmonary effort is normal.      Breath sounds: Normal breath sounds.   Abdominal:      General: Abdomen is flat. Bowel sounds are normal.      Palpations: Abdomen is soft.      Tenderness: There is abdominal tenderness in the suprapubic area and left lower quadrant.   Musculoskeletal:         General: Normal range of motion.      Cervical back: Normal range of motion.   Skin:     General: Skin is warm and dry.      Capillary Refill: Capillary refill takes less than 2 seconds.   Neurological:      General: No focal deficit present.      Mental Status: She is alert and oriented to person, place, and time. Mental status is at baseline.   Psychiatric:         Mood and Affect: Mood normal.                  Procedures:  Procedures      Medical Decision " Making:      Comorbidities that affect care:    Obesity    External Notes reviewed:    None      The following orders were placed and all results were independently analyzed by me:  Orders Placed This Encounter   Procedures    US Non-ob Transvaginal    [place] Draw    Comprehensive Metabolic Panel    Lipase    Urinalysis With Microscopic If Indicated (No Culture) - Urine, Clean Catch    hCG, Quantitative, Pregnancy    CBC Auto Differential    NPO Diet NPO Type: Strict NPO    Undress & Gown    Insert Peripheral IV    CBC & Differential    Green Top (Gel)    Lavender Top    Gold Top - SST    Light Blue Top       Medications Given in the Emergency Department:  Medications   sodium chloride 0.9 % flush 10 mL (has no administration in time range)        ED Course:         Labs:    Lab Results (last 24 hours)       Procedure Component Value Units Date/Time    CBC & Differential [[id number]]  (Abnormal) Collected: 01/19/24 1351    Specimen: Blood from Arm, Left Updated: 01/19/24 1414    Narrative:      The following orders were created for panel order CBC & Differential.  Procedure                               Abnormality         Status                     ---------                               -----------         ------                     CBC Auto Differential[[id number]]        Abnormal            Final result                 Please view results for these tests on the individual orders.    Comprehensive Metabolic Panel [[id number]]  (Abnormal) Collected: 01/19/24 1351    Specimen: Blood from Arm, Left Updated: 01/19/24 1438     Glucose 132 mg/dL      BUN 9 mg/dL      Creatinine 0.58 mg/dL      Sodium 140 mmol/L      Potassium 3.7 mmol/L      Comment: Slight hemolysis detected by analyzer. Result may be falsely elevated.        Chloride 104 mmol/L      CO2 23.7 mmol/L      Calcium 9.8 mg/dL      Total Protein 7.7 g/dL      Albumin 4.3 g/dL      ALT (SGPT) 48 U/L      AST (SGOT) 31 U/L      Alkaline Phosphatase 78 U/L       Total Bilirubin 0.3 mg/dL      Globulin 3.4 gm/dL      A/G Ratio 1.3 g/dL      BUN/Creatinine Ratio 15.5     Anion Gap 12.3 mmol/L      eGFR 115.3 mL/min/1.73     Narrative:      GFR Normal >60  Chronic Kidney Disease <60  Kidney Failure <15      Lipase [[id number]]  (Normal) Collected: 01/19/24 1351    Specimen: Blood from Arm, Left Updated: 01/19/24 1438     Lipase 31 U/L     hCG, Quantitative, Pregnancy [[id number]] Collected: 01/19/24 1351    Specimen: Blood from Arm, Left Updated: 01/19/24 1525     HCG Quantitative <0.50 mIU/mL     Narrative:      HCG Ranges by Gestational Age    Females - non-pregnant premenopausal   </= 1mIU/mL HCG  Females - postmenopausal               </= 7mIU/mL HCG    3 Weeks       5.4   -      72 mIU/mL  4 Weeks      10.2   -     708 mIU/mL  5 Weeks       217   -   8,245 mIU/mL  6 Weeks       152   -  32,177 mIU/mL  7 Weeks     4,059   - 153,767 mIU/mL  8 Weeks    31,366   - 149,094 mIU/mL  9 Weeks    59,109   - 135,901 mIU/mL  10 Weeks   44,186   - 170,409 mIU/mL  12 Weeks   27,107   - 201,615 mIU/mL  14 Weeks   24,302   -  93,646 mIU/mL  15 Weeks   12,540   -  69,747 mIU/mL  16 Weeks    8,904   -  55,332 mIU/mL  17 Weeks    8,240   -  51,793 mIU/mL  18 Weeks    9,649   -  55,271 mIU/mL      CBC Auto Differential [[id number]]  (Abnormal) Collected: 01/19/24 1351    Specimen: Blood from Arm, Left Updated: 01/19/24 1414     WBC 7.73 10*3/mm3      RBC 4.69 10*6/mm3      Hemoglobin 13.9 g/dL      Hematocrit 41.4 %      MCV 88.3 fL      MCH 29.6 pg      MCHC 33.6 g/dL      RDW 12.3 %      RDW-SD 39.3 fl      MPV 11.0 fL      Platelets 241 10*3/mm3      Neutrophil % 58.7 %      Lymphocyte % 33.0 %      Monocyte % 4.7 %      Eosinophil % 2.8 %      Basophil % 0.5 %      Immature Grans % 0.3 %      Neutrophils, Absolute 4.54 10*3/mm3      Lymphocytes, Absolute 2.55 10*3/mm3      Monocytes, Absolute 0.36 10*3/mm3      Eosinophils, Absolute 0.22 10*3/mm3      Basophils, Absolute 0.04 10*3/mm3       Immature Grans, Absolute 0.02 10*3/mm3      nRBC 0.0 /100 WBC     Urinalysis With Microscopic If Indicated (No Culture) - Urine, Clean Catch [[id number]]  (Normal) Collected: 01/19/24 1426    Specimen: Urine, Clean Catch Updated: 01/19/24 1435     Color, UA Yellow     Appearance, UA Clear     pH, UA 7.0     Specific Gravity, UA 1.014     Glucose, UA Negative     Ketones, UA Negative     Bilirubin, UA Negative     Blood, UA Negative     Protein, UA Negative     Leuk Esterase, UA Negative     Nitrite, UA Negative     Urobilinogen, UA 0.2 E.U./dL    Narrative:      Urine microscopic not indicated.             Imaging:    US Non-ob Transvaginal    Result Date: 1/19/2024  PROCEDURE: US NON-OB TRANSVAGINAL  COMPARISON: [facility], CT, ABDOMEN/PELVIS WITH CONTRAST, 12/27/2017, 2:17.  [facility], US, PELVIC TRANSVAGINAL, 10/07/2015, 16:30.  INDICATIONS: pelvic pain after pap a few weeks ago  TECHNIQUE: Ultrasound examination of the pelvis was performed, using endovaginal technique.   FINDINGS:  The uterus measures 7.3 x 2.7 x 3.4 cm.  The endometrium measures 2 mm in thickness.  Multiple hyperechoic nodules associated with the endometrium measuring up to 8 mm.  The left ovary measures 2.1 x 1.1 x 1.7 cm.  There is normal left ovarian vascularity.  The right ovary is not visualized secondary to overlying bowel gas.  Within the Caesarean section scar anterior to the uterus, there is a lobulated hypoechoic nonvascular lesion  measuring 1.7 x 0.9 x 1.8 cm.       No acute pelvic abnormality.  Multiple small hyperechoic nodules associated with the endometrium measuring up to 8 mm, which could represent small submucosal fibroids.   1.8 cm lobulated hypoechoic nonvascular lesion at the Caesarean section scar site anterior to the uterus.  Imaging findings are nonspecific and could represent a persistent postsurgical seroma or scar endometrioma.  Findings appear relatively stable from 2015 ultrasound      KENDALL SABA MD       Electronically Signed and Approved By: KENDALL SABA MD on 1/19/2024 at 15:59                Differential Diagnosis and Discussion:    Pelvic Pain: Differential diagnosis includes but is not limited to ectopic pregnancy, ovarian torsion, dysmenorrhea, tubo-ovarian abscess, ovarian cyst, ovulation, oophoritis, abdominal pregnancy, appendicitis, diverticulitis, cystitis, and renal colic    All labs were reviewed and interpreted by me.  Ultrasound impression was interpreted by me.     MDM     Amount and/or Complexity of Data Reviewed  Clinical lab tests: reviewed  Tests in the radiology section of CPT®: reviewed                 Patient Care Considerations:           Consultants/Shared Management Plan:    None    Social Determinants of Health:    Patient is independent, reliable, and has access to care.       Disposition and Care Coordination:    Discharged: The patient is suitable and stable for discharge with no need for consideration of admission.    I have explained the patient´s condition, diagnoses and treatment plan based on the information available to me at this time. I have answered questions and addressed any concerns. The patient has a good  understanding of the patient´s diagnosis, condition, and treatment plan as can be expected at this point. The vital signs have been stable. The patient´s condition is stable and appropriate for discharge from the emergency department.      The patient will pursue further outpatient evaluation with the primary care physician or other designated or consulting physician as outlined in the discharge instructions. They are agreeable to this plan of care and follow-up instructions have been explained in detail. The patient has received these instructions in written format and have expressed an understanding of the discharge instructions. The patient is aware that any significant change in condition or worsening of symptoms should prompt an immediate return  to this or the closest emergency department or call to 911.  I have explained discharge medications and the need for follow up with the patient/caretakers. This was also printed in the discharge instructions. Patient was discharged with the following medications and follow up:      Medication List      No changes were made to your prescriptions during this visit.      Maris Gordillo, APRN  [address] [address]  [phone]      As needed       Final diagnoses:   Pelvic pain   Uterine leiomyoma, unspecified location        ED Disposition       ED Disposition   Discharge    Condition   Stable    Comment   --               This medical record created using voice recognition software.             Sveta Colindres, APRN  01/19/24 1773

## 2024-01-19 NOTE — DISCHARGE INSTRUCTIONS
Follow up with your gynecologist as we discussed  Continue taking the naproxen you were prescribed for pain as needed

## 2024-01-19 NOTE — Clinical Note
[facility] EMERGENCY ROOM  [address]  [place] [address]  Phone: [phone]    Jessenia Platt was seen and treated in our emergency department on 1/19/2024.  She may return to work on 01/22/2024.         Thank you for choosing [facility].    Sveta Colindres, APRN

## 2024-01-23 DIAGNOSIS — D25.9 UTERINE LEIOMYOMA, UNSPECIFIED LOCATION: Primary | ICD-10-CM

## 2024-01-23 DIAGNOSIS — R10.2 PELVIC PAIN: ICD-10-CM

## 2024-01-29 ENCOUNTER — PREP FOR SURGERY (OUTPATIENT)
Dept: OTHER | Facility: HOSPITAL | Age: 44
End: 2024-01-29
Payer: MEDICAID

## 2024-01-29 ENCOUNTER — OFFICE VISIT (OUTPATIENT)
Dept: OBSTETRICS AND GYNECOLOGY | Facility: CLINIC | Age: 44
End: 2024-01-29
Payer: MEDICAID

## 2024-01-29 VITALS
BODY MASS INDEX: 45.99 KG/M2 | SYSTOLIC BLOOD PRESSURE: 142 MMHG | DIASTOLIC BLOOD PRESSURE: 96 MMHG | WEIGHT: 293 LBS | HEIGHT: 67 IN

## 2024-01-29 DIAGNOSIS — R10.2 CHRONIC PELVIC PAIN IN FEMALE: Primary | ICD-10-CM

## 2024-01-29 DIAGNOSIS — N95.1 MENOPAUSAL VASOMOTOR SYNDROME: ICD-10-CM

## 2024-01-29 DIAGNOSIS — G89.29 CHRONIC PELVIC PAIN IN FEMALE: Primary | ICD-10-CM

## 2024-01-29 LAB — FSH SERPL-ACNC: 10.9 MIU/ML

## 2024-01-29 PROCEDURE — 83001 ASSAY OF GONADOTROPIN (FSH): CPT | Performed by: OBSTETRICS & GYNECOLOGY

## 2024-01-29 RX ORDER — SODIUM CHLORIDE 0.9 % (FLUSH) 0.9 %
10 SYRINGE (ML) INJECTION AS NEEDED
OUTPATIENT
Start: 2024-01-29

## 2024-01-29 RX ORDER — SODIUM CHLORIDE 0.9 % (FLUSH) 0.9 %
3 SYRINGE (ML) INJECTION EVERY 12 HOURS SCHEDULED
OUTPATIENT
Start: 2024-01-29

## 2024-01-29 RX ORDER — SODIUM CHLORIDE 9 MG/ML
40 INJECTION, SOLUTION INTRAVENOUS AS NEEDED
OUTPATIENT
Start: 2024-01-29

## 2024-01-29 NOTE — PATIENT INSTRUCTIONS
Venipuncture Blood Specimen Collection  Venipuncture performed in left arm by Batsheva Medina with good hemostasis. Patient tolerated the procedure well without complications.   01/29/24   Batsheva Medina

## 2024-01-30 ENCOUNTER — PATIENT ROUNDING (BHMG ONLY) (OUTPATIENT)
Dept: OBSTETRICS AND GYNECOLOGY | Facility: CLINIC | Age: 44
End: 2024-01-30
Payer: MEDICAID

## 2024-01-30 NOTE — PROGRESS NOTES
"GYN Visit    Chief Complaint   Patient presents with    Follow-up     FU pelvic pain. Pt had US done at US       HPI:   44 y.o. LMP: No LMP recorded. Patient has had an ablation. Who presents today for follow up from ED for chronic pelvic pain. Overall US was WNL, she was noted to have small fibroids but she states before her endometrial ablation she had these. She denies any bleeding since endometrial ablation but continues to have severe cramping each month. She said she had this prior to her endometrial ablation but she was never diagnosed with endometriosis. She was counseled on trying medicine for endometriosis verses surgery to check for endometrial implants. She wishes to proceed with surgery for diagnosis. She does complain of some issues and pain with bowel movements but no pain with intercourse. She does complain of vasomotor symptoms such as hot flashes and nights sweats, her TSH was normal in July of last year, we can obtain FSH to check on menopausal status.  We will plan for diagnostic laparoscopy with possible fulguration of endometrial implants. Risks and benefits and alternatives of surgery were discussed with patient.  Risks are not limited to anesthesia, bleeding, blood transfusion, infection, damage to surrounding organs, wound separation, re-operation, thromboembolic disease, death.        History: PMHx, Meds, Allergies, PSHx, Social Hx, and POBHx all reviewed and updated.    PHYSICAL EXAM:  /96   Ht 170.2 cm (67\")   Wt (!) 138 kg (304 lb 6.4 oz)   BMI 47.68 kg/m²   General- NAD, alert and oriented, appropriate  Psych- Normal mood, good memory  Neck- No masses, no thyroid enlargement  Lymphatic- No palpable groin nodes  Extremities- No edema, no cyanosis    Skin- No lesions, no rashes    ASSESSMENT AND PLAN:  Diagnoses and all orders for this visit:    1. Chronic pelvic pain in female (Primary)    2. Menopausal vasomotor syndrome  -     Follicle Stimulating Hormone; Future  -     " Follicle Stimulating Hormone     -Diagnostic laparoscopy with possible fulguration of endometrial implants     TRACK MENSES, RTO if <q21 days (frequent) or >q3mo (infrequent IF not on hormonal BC), >7d long, heavy, or painful.      Follow Up:  Return in about 2 weeks (around 2/12/2024) for Post op visit.    I spent 20 minutes caring for Jessenia on this date of service. This time includes time spent by me in the following activities:preparing for the visit, reviewing tests, obtaining and/or reviewing a separately obtained history, counseling and educating the patient/family/caregiver, ordering medications, tests, or procedures, and documenting information in the medical record    Jolly Brown DO  01/29/2024    [facility]  [facility] [place] [address]  Dept: [phone]  Dept Fax: [phone]  Loc: [phone]  Loc Fax: [phone]

## 2024-01-30 NOTE — PROGRESS NOTES
A My-Chart message has been sent to the patient for PATIENT ROUNDING with [facility].

## 2024-02-13 PROBLEM — G89.29 CHRONIC PELVIC PAIN IN FEMALE: Status: ACTIVE | Noted: 2024-01-29

## 2024-02-13 PROBLEM — R10.2 CHRONIC PELVIC PAIN IN FEMALE: Status: ACTIVE | Noted: 2024-01-29

## 2024-03-14 NOTE — H&P
GYN HISTORY & PHYSICAL      Patient Name: Jessenia Platt  : 1980  MRN: [id number]    Subjective     Chief Complaint: Chronic Pelvic Pain       HPI:  44 y.o.  No LMP recorded. Patient has had an ablation.. Patient here today to undergo Operative laparoscopy with fulguration of endometrial implants .   She wishes to proceed with the above procedure. She denies any F/C, D/C, N/V, CP or SOB.     Risks and benefits and alternatives of surgery were discussed with patient.  Risks are not limited to anesthesia, bleeding, blood transfusion, infection, damage to surrounding organs, wound separation, re-operation, thromboembolic disease, death.            ROS: Review of Systems   Constitutional: Negative.    HENT: Negative.     Eyes: Negative.    Respiratory: Negative.     Cardiovascular: Negative.    Gastrointestinal: Negative.    Endocrine: Negative.    Genitourinary:  Positive for dyspareunia and pelvic pain.   Musculoskeletal: Negative.    Skin: Negative.    Allergic/Immunologic: Negative.    Neurological: Negative.    Hematological: Negative.          Personal History     PMHx:    Past Medical History:   Diagnosis Date    Allergic     Asthma     Fibroid     Hypertension        OBHx:   OB History    Para Term  AB Living   1 1 1     1   SAB IAB Ectopic Molar Multiple Live Births             1      # Outcome Date GA Lbr Rio/2nd Weight Sex Type Anes PTL Lv   1 Term 99 40w0d   F CS-LTranv  N GRAEME        Home Medications:  clobetasol prop emollient base, ibuprofen, and triamcinolone    Allergies:  No Known Allergies    PSHx:   Past Surgical History:   Procedure Laterality Date     SECTION      ENDOMETRIAL ABLATION      LAPAROSCOPIC TUBAL LIGATION Bilateral        Social History:    reports that she quit smoking about 22 years ago. Her smoking use included cigarettes. She started smoking about 32 years ago. She has a 1 pack-year smoking history. She has never used smokeless tobacco.  She reports that she does not currently use alcohol. She reports that she does not use drugs.      Objective     Vitals:   Temp:  [97.6 °F (36.4 °C)] 97.6 °F (36.4 °C)  Heart Rate:  [64] 64  Resp:  [20] 20  BP: (182)/(91) 182/91    PHYSICAL EXAM:   General- NAD, alert and oriented, appropriate  Psych- Normal mood, good memory  CV- Regular rhythm, no murnurs  Resp- CTA to bases, no wheezes  Abdomen- NABS, soft, non distended, non tender, no masses  Pelvic- Defer to OR     Lab/Imaging/Other:    PROCEDURE:US NON-OB TRANSVAGINAL     COMPARISON: [facility], CT, ABDOMEN/PELVIS WITH CONTRAST, 12/27/2017, 2:17.  [facility], US, PELVIC TRANSVAGINAL, 10/07/2015, 16:30.     INDICATIONS:pelvic pain after pap a few weeks ago     TECHNIQUE:    Ultrasound examination of the pelvis was performed, using endovaginal technique.       FINDINGS:          The uterus measures 7.3 x 2.7 x 3.4 cm.  The endometrium measures 2 mm in thickness.  Multiple   hyperechoic nodules associated with the endometrium measuring up to 8 mm.     The left ovary measures 2.1 x 1.1 x 1.7 cm.  There is normal left ovarian vascularity.     The right ovary is not visualized secondary to overlying bowel gas.     Within the Caesarean section scar anterior to the uterus, there is a lobulated hypoechoic   nonvascular lesion  measuring 1.7 x 0.9 x 1.8 cm.     IMPRESSION:               No acute pelvic abnormality.     Multiple small hyperechoic nodules associated with the endometrium measuring up to 8 mm, which   could represent small submucosal fibroids.       1.8 cm lobulated hypoechoic nonvascular lesion at the Caesarean section scar site anterior to the   uterus.  Imaging findings are nonspecific and could represent a persistent postsurgical seroma or   scar endometrioma.  Findings appear relatively stable from 2015 ultrasound            KENDALL SABA MD         Electronically Signed and Approved By: KENDALL SABA MD on 1/19/2024 at 15:59           Assessment / Plan     Assessment:  Chronic pelvic pain     Plan:   Operative laparoscopy with fulguration of endometrial implants   Patient to follow up in office in 2 weeks for post op visit. All questions and concerns have been answered.       Counseling: Risks and benefits and alternatives of surgery were discussed with patient.  Risks are not limited to anesthesia, bleeding, blood transfusion, infection, damage to surrounding organs, wound separation, re-operation, thromboembolic disease, death.  See separate written and signed consent for surgical specific risks and benefits.        Signature:   Electronically signed by:    Jolly Brown DO  03/15/24  09:45 EDT

## 2024-03-15 ENCOUNTER — ANESTHESIA (OUTPATIENT)
Dept: PERIOP | Facility: HOSPITAL | Age: 44
End: 2024-03-15
Payer: MEDICAID

## 2024-03-15 ENCOUNTER — HOSPITAL ENCOUNTER (OUTPATIENT)
Facility: HOSPITAL | Age: 44
Setting detail: HOSPITAL OUTPATIENT SURGERY
Discharge: HOME | End: 2024-03-15
Attending: OBSTETRICS & GYNECOLOGY | Admitting: OBSTETRICS & GYNECOLOGY
Payer: MEDICAID

## 2024-03-15 ENCOUNTER — ANESTHESIA EVENT (OUTPATIENT)
Dept: PERIOP | Facility: HOSPITAL | Age: 44
End: 2024-03-15
Payer: MEDICAID

## 2024-03-15 VITALS
RESPIRATION RATE: 16 BRPM | SYSTOLIC BLOOD PRESSURE: 137 MMHG | HEART RATE: 65 BPM | WEIGHT: 293 LBS | HEIGHT: 67 IN | OXYGEN SATURATION: 96 % | DIASTOLIC BLOOD PRESSURE: 67 MMHG | TEMPERATURE: 97.3 F | BODY MASS INDEX: 45.99 KG/M2

## 2024-03-15 DIAGNOSIS — R10.2 CHRONIC PELVIC PAIN IN FEMALE: ICD-10-CM

## 2024-03-15 DIAGNOSIS — Z98.890 S/P LAPAROSCOPY: Primary | ICD-10-CM

## 2024-03-15 DIAGNOSIS — G89.29 CHRONIC PELVIC PAIN IN FEMALE: ICD-10-CM

## 2024-03-15 LAB — B-HCG UR QL: NEGATIVE

## 2024-03-15 PROCEDURE — 25010000002 KETOROLAC TROMETHAMINE PER 15 MG: Performed by: NURSE ANESTHETIST, CERTIFIED REGISTERED

## 2024-03-15 PROCEDURE — 25010000002 ONDANSETRON PER 1 MG: Performed by: NURSE ANESTHETIST, CERTIFIED REGISTERED

## 2024-03-15 PROCEDURE — 81025 URINE PREGNANCY TEST: CPT | Performed by: OBSTETRICS & GYNECOLOGY

## 2024-03-15 PROCEDURE — 25010000002 HYDROMORPHONE 1 MG/ML SOLUTION: Performed by: NURSE ANESTHETIST, CERTIFIED REGISTERED

## 2024-03-15 PROCEDURE — 25010000002 DEXAMETHASONE PER 1 MG: Performed by: NURSE ANESTHETIST, CERTIFIED REGISTERED

## 2024-03-15 PROCEDURE — 25810000003 LACTATED RINGERS PER 1000 ML: Performed by: ANESTHESIOLOGY

## 2024-03-15 PROCEDURE — 25010000002 FENTANYL CITRATE (PF) 50 MCG/ML SOLUTION: Performed by: NURSE ANESTHETIST, CERTIFIED REGISTERED

## 2024-03-15 PROCEDURE — 25010000002 MIDAZOLAM PER 1MG: Performed by: ANESTHESIOLOGY

## 2024-03-15 PROCEDURE — 49320 DIAG LAPARO SEPARATE PROC: CPT | Performed by: OBSTETRICS & GYNECOLOGY

## 2024-03-15 PROCEDURE — 25010000002 PROPOFOL 10 MG/ML EMULSION: Performed by: NURSE ANESTHETIST, CERTIFIED REGISTERED

## 2024-03-15 PROCEDURE — 25010000002 METOCLOPRAMIDE PER 10 MG: Performed by: ANESTHESIOLOGY

## 2024-03-15 PROCEDURE — 25010000002 SUGAMMADEX 200 MG/2ML SOLUTION: Performed by: NURSE ANESTHETIST, CERTIFIED REGISTERED

## 2024-03-15 RX ORDER — PROMETHAZINE HYDROCHLORIDE 12.5 MG/1
25 TABLET ORAL ONCE AS NEEDED
Status: DISCONTINUED | OUTPATIENT
Start: 2024-03-15 | End: 2024-03-15 | Stop reason: HOSPADM

## 2024-03-15 RX ORDER — LIDOCAINE HYDROCHLORIDE AND EPINEPHRINE 10; 10 MG/ML; UG/ML
INJECTION, SOLUTION INFILTRATION; PERINEURAL AS NEEDED
Status: DISCONTINUED | OUTPATIENT
Start: 2024-03-15 | End: 2024-03-15 | Stop reason: HOSPADM

## 2024-03-15 RX ORDER — IBUPROFEN 800 MG/1
800 TABLET ORAL EVERY 6 HOURS PRN
COMMUNITY

## 2024-03-15 RX ORDER — SODIUM CHLORIDE 0.9 % (FLUSH) 0.9 %
10 SYRINGE (ML) INJECTION AS NEEDED
Status: DISCONTINUED | OUTPATIENT
Start: 2024-03-15 | End: 2024-03-15 | Stop reason: HOSPADM

## 2024-03-15 RX ORDER — SODIUM CHLORIDE 0.9 % (FLUSH) 0.9 %
3 SYRINGE (ML) INJECTION EVERY 12 HOURS SCHEDULED
Status: DISCONTINUED | OUTPATIENT
Start: 2024-03-15 | End: 2024-03-15 | Stop reason: HOSPADM

## 2024-03-15 RX ORDER — PROMETHAZINE HYDROCHLORIDE 25 MG/1
25 SUPPOSITORY RECTAL ONCE AS NEEDED
Status: DISCONTINUED | OUTPATIENT
Start: 2024-03-15 | End: 2024-03-15 | Stop reason: HOSPADM

## 2024-03-15 RX ORDER — KETAMINE HCL IN NACL, ISO-OSM 100MG/10ML
SYRINGE (ML) INJECTION AS NEEDED
Status: DISCONTINUED | OUTPATIENT
Start: 2024-03-15 | End: 2024-03-15 | Stop reason: SURG

## 2024-03-15 RX ORDER — DEXMEDETOMIDINE HYDROCHLORIDE 100 UG/ML
INJECTION, SOLUTION INTRAVENOUS AS NEEDED
Status: DISCONTINUED | OUTPATIENT
Start: 2024-03-15 | End: 2024-03-15 | Stop reason: SURG

## 2024-03-15 RX ORDER — PROPOFOL 10 MG/ML
VIAL (ML) INTRAVENOUS AS NEEDED
Status: DISCONTINUED | OUTPATIENT
Start: 2024-03-15 | End: 2024-03-15 | Stop reason: SURG

## 2024-03-15 RX ORDER — SODIUM CHLORIDE 9 MG/ML
40 INJECTION, SOLUTION INTRAVENOUS AS NEEDED
Status: DISCONTINUED | OUTPATIENT
Start: 2024-03-15 | End: 2024-03-15 | Stop reason: HOSPADM

## 2024-03-15 RX ORDER — KETOROLAC TROMETHAMINE 30 MG/ML
INJECTION, SOLUTION INTRAMUSCULAR; INTRAVENOUS AS NEEDED
Status: DISCONTINUED | OUTPATIENT
Start: 2024-03-15 | End: 2024-03-15 | Stop reason: SURG

## 2024-03-15 RX ORDER — FAMOTIDINE 10 MG/ML
20 INJECTION, SOLUTION INTRAVENOUS
Status: COMPLETED | OUTPATIENT
Start: 2024-03-15 | End: 2024-03-15

## 2024-03-15 RX ORDER — LIDOCAINE HYDROCHLORIDE 20 MG/ML
INJECTION, SOLUTION EPIDURAL; INFILTRATION; INTRACAUDAL; PERINEURAL AS NEEDED
Status: DISCONTINUED | OUTPATIENT
Start: 2024-03-15 | End: 2024-03-15 | Stop reason: SURG

## 2024-03-15 RX ORDER — SODIUM CHLORIDE, SODIUM LACTATE, POTASSIUM CHLORIDE, CALCIUM CHLORIDE 600; 310; 30; 20 MG/100ML; MG/100ML; MG/100ML; MG/100ML
9 INJECTION, SOLUTION INTRAVENOUS CONTINUOUS PRN
Status: DISCONTINUED | OUTPATIENT
Start: 2024-03-15 | End: 2024-03-15 | Stop reason: HOSPADM

## 2024-03-15 RX ORDER — ROCURONIUM BROMIDE 10 MG/ML
INJECTION, SOLUTION INTRAVENOUS AS NEEDED
Status: DISCONTINUED | OUTPATIENT
Start: 2024-03-15 | End: 2024-03-15 | Stop reason: SURG

## 2024-03-15 RX ORDER — MAGNESIUM HYDROXIDE 1200 MG/15ML
LIQUID ORAL AS NEEDED
Status: DISCONTINUED | OUTPATIENT
Start: 2024-03-15 | End: 2024-03-15 | Stop reason: HOSPADM

## 2024-03-15 RX ORDER — SUCCINYLCHOLINE/SOD CL,ISO/PF 100 MG/5ML
SYRINGE (ML) INTRAVENOUS AS NEEDED
Status: DISCONTINUED | OUTPATIENT
Start: 2024-03-15 | End: 2024-03-15 | Stop reason: SURG

## 2024-03-15 RX ORDER — SCOLOPAMINE TRANSDERMAL SYSTEM 1 MG/1
1 PATCH, EXTENDED RELEASE TRANSDERMAL ONCE
Status: DISCONTINUED | OUTPATIENT
Start: 2024-03-15 | End: 2024-03-15 | Stop reason: HOSPADM

## 2024-03-15 RX ORDER — ONDANSETRON 2 MG/ML
4 INJECTION INTRAMUSCULAR; INTRAVENOUS ONCE AS NEEDED
Status: DISCONTINUED | OUTPATIENT
Start: 2024-03-15 | End: 2024-03-15 | Stop reason: HOSPADM

## 2024-03-15 RX ORDER — OXYCODONE HYDROCHLORIDE 5 MG/1
5 TABLET ORAL
Status: COMPLETED | OUTPATIENT
Start: 2024-03-15 | End: 2024-03-15

## 2024-03-15 RX ORDER — FENTANYL CITRATE 50 UG/ML
INJECTION, SOLUTION INTRAMUSCULAR; INTRAVENOUS AS NEEDED
Status: DISCONTINUED | OUTPATIENT
Start: 2024-03-15 | End: 2024-03-15 | Stop reason: SURG

## 2024-03-15 RX ORDER — ONDANSETRON 2 MG/ML
INJECTION INTRAMUSCULAR; INTRAVENOUS AS NEEDED
Status: DISCONTINUED | OUTPATIENT
Start: 2024-03-15 | End: 2024-03-15 | Stop reason: SURG

## 2024-03-15 RX ORDER — DEXAMETHASONE SODIUM PHOSPHATE 4 MG/ML
INJECTION, SOLUTION INTRA-ARTICULAR; INTRALESIONAL; INTRAMUSCULAR; INTRAVENOUS; SOFT TISSUE AS NEEDED
Status: DISCONTINUED | OUTPATIENT
Start: 2024-03-15 | End: 2024-03-15 | Stop reason: SURG

## 2024-03-15 RX ORDER — HYDROCODONE BITARTRATE AND ACETAMINOPHEN 5; 325 MG/1; MG/1
1 TABLET ORAL EVERY 6 HOURS PRN
Qty: 5 TABLET | Refills: 0 | Status: SHIPPED | OUTPATIENT
Start: 2024-03-15

## 2024-03-15 RX ORDER — ACETAMINOPHEN 500 MG
1000 TABLET ORAL ONCE
Status: COMPLETED | OUTPATIENT
Start: 2024-03-15 | End: 2024-03-15

## 2024-03-15 RX ORDER — MIDAZOLAM HYDROCHLORIDE 2 MG/2ML
2 INJECTION, SOLUTION INTRAMUSCULAR; INTRAVENOUS ONCE
Status: COMPLETED | OUTPATIENT
Start: 2024-03-15 | End: 2024-03-15

## 2024-03-15 RX ORDER — METOCLOPRAMIDE HYDROCHLORIDE 5 MG/ML
10 INJECTION INTRAMUSCULAR; INTRAVENOUS ONCE
Status: COMPLETED | OUTPATIENT
Start: 2024-03-15 | End: 2024-03-15

## 2024-03-15 RX ADMIN — ONDANSETRON HYDROCHLORIDE 4 MG: 2 SOLUTION INTRAMUSCULAR; INTRAVENOUS at 10:20

## 2024-03-15 RX ADMIN — HYDROMORPHONE HYDROCHLORIDE 0.5 MG: 1 INJECTION, SOLUTION INTRAMUSCULAR; INTRAVENOUS; SUBCUTANEOUS at 11:41

## 2024-03-15 RX ADMIN — SCOPALAMINE 1 PATCH: 1 PATCH, EXTENDED RELEASE TRANSDERMAL at 09:11

## 2024-03-15 RX ADMIN — HYDROMORPHONE HYDROCHLORIDE 0.25 MG: 1 INJECTION, SOLUTION INTRAMUSCULAR; INTRAVENOUS; SUBCUTANEOUS at 12:06

## 2024-03-15 RX ADMIN — ROCURONIUM BROMIDE 50 MG: 10 INJECTION, SOLUTION INTRAVENOUS at 10:26

## 2024-03-15 RX ADMIN — Medication 20 MG: at 10:28

## 2024-03-15 RX ADMIN — MIDAZOLAM HYDROCHLORIDE 2 MG: 1 INJECTION, SOLUTION INTRAMUSCULAR; INTRAVENOUS at 09:50

## 2024-03-15 RX ADMIN — DEXMEDETOMIDINE 12 MCG: 100 INJECTION, SOLUTION INTRAVENOUS at 11:18

## 2024-03-15 RX ADMIN — LIDOCAINE HYDROCHLORIDE 60 MG: 20 INJECTION, SOLUTION INTRAVENOUS at 10:19

## 2024-03-15 RX ADMIN — HYDROMORPHONE HYDROCHLORIDE 0.25 MG: 1 INJECTION, SOLUTION INTRAMUSCULAR; INTRAVENOUS; SUBCUTANEOUS at 12:00

## 2024-03-15 RX ADMIN — SUGAMMADEX 200 MG: 100 INJECTION, SOLUTION INTRAVENOUS at 11:10

## 2024-03-15 RX ADMIN — HYDROMORPHONE HYDROCHLORIDE 0.25 MG: 1 INJECTION, SOLUTION INTRAMUSCULAR; INTRAVENOUS; SUBCUTANEOUS at 11:54

## 2024-03-15 RX ADMIN — KETOROLAC TROMETHAMINE 30 MG: 30 INJECTION, SOLUTION INTRAMUSCULAR; INTRAVENOUS at 11:18

## 2024-03-15 RX ADMIN — Medication 100 MG: at 10:19

## 2024-03-15 RX ADMIN — Medication 10 MG: at 11:18

## 2024-03-15 RX ADMIN — Medication 20 MG: at 10:19

## 2024-03-15 RX ADMIN — FAMOTIDINE 20 MG: 10 INJECTION INTRAVENOUS at 09:10

## 2024-03-15 RX ADMIN — SODIUM CHLORIDE, POTASSIUM CHLORIDE, SODIUM LACTATE AND CALCIUM CHLORIDE: 600; 310; 30; 20 INJECTION, SOLUTION INTRAVENOUS at 11:18

## 2024-03-15 RX ADMIN — PROPOFOL 180 MG: 10 INJECTION, EMULSION INTRAVENOUS at 10:19

## 2024-03-15 RX ADMIN — HYDROMORPHONE HYDROCHLORIDE 0.5 MG: 1 INJECTION, SOLUTION INTRAMUSCULAR; INTRAVENOUS; SUBCUTANEOUS at 11:48

## 2024-03-15 RX ADMIN — OXYCODONE 5 MG: 5 TABLET ORAL at 12:12

## 2024-03-15 RX ADMIN — ACETAMINOPHEN 1000 MG: 500 TABLET ORAL at 09:08

## 2024-03-15 RX ADMIN — FENTANYL CITRATE 100 MCG: 50 INJECTION, SOLUTION INTRAMUSCULAR; INTRAVENOUS at 10:21

## 2024-03-15 RX ADMIN — SODIUM CHLORIDE, POTASSIUM CHLORIDE, SODIUM LACTATE AND CALCIUM CHLORIDE 9 ML/HR: 600; 310; 30; 20 INJECTION, SOLUTION INTRAVENOUS at 09:03

## 2024-03-15 RX ADMIN — DEXAMETHASONE SODIUM PHOSPHATE 4 MG: 4 INJECTION, SOLUTION INTRAMUSCULAR; INTRAVENOUS at 10:20

## 2024-03-15 RX ADMIN — METOCLOPRAMIDE HYDROCHLORIDE 10 MG: 5 INJECTION INTRAMUSCULAR; INTRAVENOUS at 09:50

## 2024-03-15 RX ADMIN — OXYCODONE 5 MG: 5 TABLET ORAL at 12:32

## 2024-03-15 NOTE — DISCHARGE INSTRUCTIONS
DISCHARGE INSTRUCTIONS  GYNECOLOGICAL  PROCEDURES      For your surgery you had:  General anesthesia (you may have a sore throat for the first 24 hours)    You may experience dizziness, drowsiness, or lightheadedness for several hours following surgery.  Do not stay alone today or tonight.  Limit your activity for 24 hours.  Resume your diet slowly.  Follow any special dietary instructions you may have been given by your doctor.  You should not drive or operate machinery, drink alcohol, or sign legally binding documents for 24 hours or while you are taking pain medication.    NOTIFY YOUR DOCTOR IF YOU EXPERIENCE ANY OF THE FOLLOWING:  Temperature greater than 101 degrees Fahrenheit  Shaking Chills  Redness or excessive drainage from incision  Nausea, vomiting and/or pain that is not controlled by prescribed medications  Increase in bleeding or bleeding that is excessive  Unable to urinate in 6 hours after surgery  If unable to reach your doctor, please go to the closest Emergency Room [x] Change balta-pad as needed.    [x] Skin adhesive will flake off in 10-14 days.    [x] Nothing in the vagina for 2 weeks to include intercourse, douches, or tampons.  [x] You may resume intercourse and the use of tampons as your physician has instructed you.      Vaginal bleeding may be expected for several days with flow decreasing with time and never any heavier than a normal  period.  If you have an ablation, vaginal discharge is expected after bleeding stops.   If you have foul smelling discharge, notify your physician.  Medications per physician instructions as indicated on After Visit Summary.    Last dose of pain medication was given at:   Oxy 5 mg @ 12:12 & 12:32 pm    SPECIAL INSTRUCTIONS:

## 2024-03-15 NOTE — ANESTHESIA POSTPROCEDURE EVALUATION
Patient: Jessenia Platt    Procedure Summary       Date: 03/15/24 Room / Location: [facility] OR 01 / [facility] MAIN OR    Anesthesia Start: 1012 Anesthesia Stop: 1129    Procedure: DIAGNOSTIC LAPARASCOPY (Abdomen) Diagnosis:       Chronic pelvic pain in female      (Chronic pelvic pain in female [R10.2, G89.29])    Surgeons: Jolly Brown DO Provider: Gianfranco Gallegos MD    Anesthesia Type: general ASA Status: 3            Anesthesia Type: general    Vitals  Vitals Value Taken Time   /60 03/15/24 1156   Temp 36.6 °C (97.8 °F) 03/15/24 1155   Pulse 64 03/15/24 1159   Resp 10 03/15/24 1155   SpO2 95 % 03/15/24 1159   Vitals shown include unfiled device data.        Post Anesthesia Care and Evaluation    Patient location during evaluation: bedside  Patient participation: complete - patient participated  Level of consciousness: awake  Pain management: adequate    Airway patency: patent  PONV Status: none  Cardiovascular status: acceptable and stable  Respiratory status: acceptable  Hydration status: acceptable    Comments: An Anesthesiologist personally participated in the most demanding procedures (including induction and emergence if applicable) in the anesthesia plan, monitored the course of anesthesia administration at frequent intervals and remained physically present and available for immediate diagnosis and treatment of emergencies.

## 2024-03-15 NOTE — ANESTHESIA PREPROCEDURE EVALUATION
Anesthesia Evaluation     Patient summary reviewed and Nursing notes reviewed                Airway   Mallampati: I  TM distance: >3 FB  Neck ROM: full  No difficulty expected  Dental      Pulmonary - normal exam    breath sounds clear to auscultation  (+) asthma,  Cardiovascular - normal exam    Rhythm: regular  Rate: normal    (+) hypertension      Neuro/Psych- negative ROS  GI/Hepatic/Renal/Endo    (+) morbid obesity    Musculoskeletal (-) negative ROS    Abdominal    Substance History - negative use     OB/GYN negative ob/gyn ROS         Other                      Anesthesia Plan    ASA 3     general     intravenous induction     Anesthetic plan, risks, benefits, and alternatives have been provided, discussed and informed consent has been obtained with: patient.    CODE STATUS:

## 2024-03-15 NOTE — OP NOTE
PRE OP DIAGNOSIS:  Chronic Pelvic Pain     POST OP DIAGNOSIS:  Same, Omentum adhesions note near umbilicus to superior abdominal wall, no endometrial implants noted     PROCEDURE: Diagnostic laparoscopy      SURGEON:  Jolly Brown DO    ASSISTANT: Assistant: Gina Tubbs RN CSA was responsible for performing the following activities: Retraction, Closing, and Held/Positioned Camera and their skilled assistance was necessary for the success of this case.    ANESTHESIA PROVIDER:  Gianfranco Gallegos MD    ANESTHESIA TYPE:  General    ESTIMATED BLOOD LOSS:  5ml     COMPLICATIONS:  None    DISPOSITION:  To recovery room in stable condition    FINDINGS:  Omentum adhesions note near umbilicus to superior abdominal wall, no endometrial implants noted       SPECIMEN: none     PROCEDURE:     The patient was taken to the operating room where general anesthesia was placed without difficulty.  She was prepped and draped in a normal sterile fashion and placed in low lithotomy position. A straight catheter was used to drain the bladder.  An exam under anesthesia was performed.  A speculum was placed in the vagina and a single-tooth tenaculum was used to grasp the anterior lip of the cervix.  Gamewell manipulator was placed.  Attention was then turned to the abdomen.    A 5 mm incision was made in the umbilicus. The anterior abdominal wall was tented up. Opti-Ignacio non-bladed trocar was placed intra-abdominally visualizing all tissue planes through to peritoneum. Entry into the intra-abdominal cavity was confirmed. CO2 pneumoperitoneum was inflated.    A full evaluation of the abdomen and pelvis was performed with findings as above.  There was no noted damage to underlying bowel, bladder, blood vessels, or organs. The patient was placed in steep trendelenburg. A 5 mm incision was made on the patient's left side 2 cm superior to the ASIS and 8cm from the umbilicus. A 5 mm trocar was placed under direct visualization.  A 10 mm  incision was made on the patient's right side 2 cm superior to the ASIS and 8cm from the umbilicus, an 11 mm trocar was placed under direct visualization.        The pneumoperitoneum was allowed to escape.  The trocars were removed without difficulty.  Hemostasis was assured.   The  skin was closed with 4-0 Vicryl in a subcuticular fashion.    All instruments were removed from the patient's vagina.  Sponge, lap, and instrument counts are correct times 2.  The patient was taken to the recovery room awake and in stable condition.    Pt was discharged in stable condition from PACU with script for pain medication and instructions to follow up in the office in 2-3 weeks.        Electronically signed by:    Jolly Brown DO  03/15/24  11:22 EDT

## 2024-03-17 ENCOUNTER — HOSPITAL ENCOUNTER (EMERGENCY)
Facility: HOSPITAL | Age: 44
Discharge: HOME | End: 2024-03-17
Attending: EMERGENCY MEDICINE | Admitting: EMERGENCY MEDICINE
Payer: MEDICAID

## 2024-03-17 VITALS
DIASTOLIC BLOOD PRESSURE: 96 MMHG | BODY MASS INDEX: 45.99 KG/M2 | SYSTOLIC BLOOD PRESSURE: 179 MMHG | WEIGHT: 293 LBS | RESPIRATION RATE: 18 BRPM | TEMPERATURE: 97.7 F | HEART RATE: 63 BPM | HEIGHT: 67 IN | OXYGEN SATURATION: 95 %

## 2024-03-17 DIAGNOSIS — Z48.89 ENCOUNTER FOR POST SURGICAL WOUND CHECK: Primary | ICD-10-CM

## 2024-03-17 PROCEDURE — 99282 EMERGENCY DEPT VISIT SF MDM: CPT

## 2024-03-17 RX ORDER — LIDOCAINE 50 MG/G
1 OINTMENT TOPICAL
Qty: 30 G | Refills: 0 | Status: SHIPPED | OUTPATIENT
Start: 2024-03-17 | End: 2024-03-17

## 2024-03-17 RX ORDER — DOCUSATE SODIUM 100 MG/1
100 CAPSULE, LIQUID FILLED ORAL 2 TIMES DAILY
Qty: 10 CAPSULE | Refills: 0 | Status: SHIPPED | OUTPATIENT
Start: 2024-03-17 | End: 2024-03-17

## 2024-03-17 RX ORDER — DICLOFENAC SODIUM 75 MG/1
75 TABLET, DELAYED RELEASE ORAL 2 TIMES DAILY
Qty: 14 TABLET | Refills: 0 | Status: SHIPPED | OUTPATIENT
Start: 2024-03-17 | End: 2024-03-24

## 2024-03-17 RX ORDER — DOCUSATE SODIUM 100 MG/1
100 CAPSULE, LIQUID FILLED ORAL 2 TIMES DAILY
Qty: 10 CAPSULE | Refills: 0 | Status: SHIPPED | OUTPATIENT
Start: 2024-03-17 | End: 2024-03-22

## 2024-03-17 RX ORDER — DICLOFENAC SODIUM 75 MG/1
75 TABLET, DELAYED RELEASE ORAL 2 TIMES DAILY
Qty: 14 TABLET | Refills: 0 | Status: SHIPPED | OUTPATIENT
Start: 2024-03-17 | End: 2024-03-17

## 2024-03-17 RX ORDER — LIDOCAINE 50 MG/G
1 OINTMENT TOPICAL
Qty: 30 G | Refills: 0 | Status: SHIPPED | OUTPATIENT
Start: 2024-03-17

## 2024-03-17 NOTE — ED PROVIDER NOTES
Time: 5:08 PM EDT  Date of encounter:  3/17/2024  Independent Historian/Clinical History and Information was obtained by:   Patient    History is limited by: N/A    Chief Complaint: Post-op pain      History of Present Illness:  Patient is a 44 y.o. year old female who presents to the emergency department for evaluation of post-op pain. Patient is two days post-laparoscopy with fulguration of endometrial implants. She was discharged with Norco 5mg. She states that she was sent home with 5 tablets of Norco. She has finished these medications. Denies any fever, constipation, diarrhea, nausea, vomiting. She also wanted us to check her incisional sites; states that the right side looks worse than the left.    HPI    Patient Care Team  Primary Care Provider: Maris Gordillo APRN    Past Medical History:     No Known Allergies  Past Medical History:   Diagnosis Date    Allergic     Asthma     Fibroid     Hypertension      Past Surgical History:   Procedure Laterality Date     SECTION      ENDOMETRIAL ABLATION      LAPAROSCOPIC TUBAL LIGATION Bilateral      Family History   Problem Relation Age of Onset    Breast cancer Neg Hx     Uterine cancer Neg Hx     Ovarian cancer Neg Hx     Colon cancer Neg Hx     Malig Hyperthermia Neg Hx        Home Medications:  Prior to Admission medications    Medication Sig Start Date End Date Taking? Authorizing Provider   HYDROcodone-acetaminophen (Norco) 5-325 MG per tablet Take 1 tablet by mouth Every 6 (Six) Hours As Needed for Moderate Pain. 3/15/24  Yes Jolly Brown,    ibuprofen (ADVIL,MOTRIN) 800 MG tablet Take 1 tablet by mouth Every 6 (Six) Hours As Needed for Mild Pain.   Yes Provider, MD Ju   clobetasol prop emollient base (Clobetasol Propionate E) 0.05 % emollient cream Apply 1 application  topically to the appropriate area as directed 2 (Two) Times a Day. 23   Maris Gordillo APRN   triamcinolone (KENALOG) 0.1 % cream Apply 1  "application  topically to the appropriate area as directed 2 (Two) Times a Day. 23   Gama Ayers APRN        Social History:   Social History     Tobacco Use    Smoking status: Former     Current packs/day: 0.00     Average packs/day: 0.1 packs/day for 10.0 years (1.0 ttl pk-yrs)     Types: Cigarettes     Start date:      Quit date:      Years since quittin.2    Smokeless tobacco: Never   Vaping Use    Vaping status: Never Used   Substance Use Topics    Alcohol use: Not Currently    Drug use: Never         Review of Systems:  Review of Systems   Constitutional:  Negative for chills and fever.   HENT:  Negative for ear pain.    Eyes:  Negative for pain.   Respiratory:  Negative for cough and shortness of breath.    Cardiovascular:  Negative for chest pain.   Gastrointestinal:  Negative for abdominal pain, diarrhea, nausea and vomiting.   Genitourinary:  Negative for dysuria.   Musculoskeletal:  Negative for arthralgias.   Skin:  Positive for wound. Negative for rash.   Neurological:  Negative for headaches.        Physical Exam:  /96   Pulse 63   Temp 97.7 °F (36.5 °C) (Oral)   Resp 18   Ht 170.2 cm (67\")   Wt (!) 137 kg (301 lb 2.4 oz)   SpO2 95%   BMI 47.17 kg/m²     Physical Exam  HENT:      Head: Normocephalic.      Mouth/Throat:      Mouth: Mucous membranes are moist.   Eyes:      Pupils: Pupils are equal, round, and reactive to light.   Pulmonary:      Effort: Pulmonary effort is normal.   Abdominal:      General: There is no distension.   Musculoskeletal:      Cervical back: Neck supple.   Skin:     General: Skin is warm and dry.      Comments: Incisional sites on the right and left lower quadrants. Both sites are well approximated. External site is approximated with skin glue. No erythema, swelling, or drainage appreciated.    Neurological:      General: No focal deficit present.      Mental Status: She is alert and oriented to person, place, and time.   Psychiatric:         " Mood and Affect: Mood normal.         Behavior: Behavior normal.                Procedures:  Procedures      Medical Decision Making:      Comorbidities that affect care:    None    External Notes reviewed:    Previous Operation Note: Reviewed op note on 3/15/24      The following orders were placed and all results were independently analyzed by me:  No orders of the defined types were placed in this encounter.      Medications Given in the Emergency Department:  Medications - No data to display     ED Course:         Labs:    Lab Results (last 24 hours)       ** No results found for the last 24 hours. **             Imaging:    No Radiology Exams Resulted Within Past 24 Hours      Differential Diagnosis and Discussion:    Laceration: Laceration was evaluated for arterial injury, ligamentous damage, and other neurovascular injury.        MDM  Risk of Complications, Morbidity, and/or Mortality  Presenting problems: moderate  Diagnostic procedures: low  Management options: low    Patient Progress  Patient progress: stable    Patient presents to the emergency department for evaluation of post-op pain. Patient is two days post-laparoscopy with fulguration of endometrial implants. She was discharged with Norco 5mg. She states that she was sent home with 5 tablets of Norco. She has finished these medications. Reports some constipation. Denies any fever, diarrhea, nausea, vomiting. She also wanted us to check her incisional sites; states that the right side looks worse than the left.     On exam, Incisional sites on the right and left lower quadrants. Both sites are well approximated. External site is approximated with skin glue. No erythema, swelling, or drainage appreciated.     I am discharging the patient with diclofenac and lidocaine cream. Stool softener for constipation. Patient will follow up with Dr. Brown's office in 1-3 days.      Patient Care Considerations:    ANTIBIOTICS: I considered prescribing antibiotics  as an outpatient however no bacterial focus of infection was found.      Consultants/Shared Management Plan:    None    Social Determinants of Health:    Patient is independent, reliable, and has access to care.       Disposition and Care Coordination:    Discharged: The patient is suitable and stable for discharge with no need for consideration of admission.    I have explained the patient´s condition, diagnoses and treatment plan based on the information available to me at this time. I have answered questions and addressed any concerns. The patient has a good  understanding of the patient´s diagnosis, condition, and treatment plan as can be expected at this point. The vital signs have been stable. The patient´s condition is stable and appropriate for discharge from the emergency department.      The patient will pursue further outpatient evaluation with the primary care physician or other designated or consulting physician as outlined in the discharge instructions. They are agreeable to this plan of care and follow-up instructions have been explained in detail. The patient has received these instructions in written format and has expressed an understanding of the discharge instructions. The patient is aware that any significant change in condition or worsening of symptoms should prompt an immediate return to this or the closest emergency department or call to 911.  I have explained discharge medications and the need for follow up with the patient/caretakers. This was also printed in the discharge instructions. Patient was discharged with the following medications and follow up:      Medication List        New Prescriptions      diclofenac 75 MG EC tablet  Commonly known as: VOLTAREN  Take 1 tablet by mouth 2 (Two) Times a Day for 7 days.     docusate sodium 100 MG capsule  Commonly known as: COLACE  Take 1 capsule by mouth 2 (Two) Times a Day for 5 days.     lidocaine 5 % ointment  Commonly known as:  XYLOCAINE  Apply 1 Application topically to the appropriate area as directed Every 2 (Two) Hours As Needed for Mild Pain.               Where to Get Your Medications        These medications were sent to [facility] [address] - [place], [place] - [address] [facility] - [phone]  - [phone]   [address] [facility] [place] [address]      Phone: [phone]   diclofenac 75 MG EC tablet  docusate sodium 100 MG capsule  lidocaine 5 % ointment      No follow-up provider specified.     Final diagnoses:   Encounter for post surgical wound check        ED Disposition       ED Disposition   Discharge    Condition   Stable    Comment   --               This medical record created using voice recognition software.             Keenan Leonard PA  03/17/24 1714       Keenan Leonard PA  03/17/24 1729

## 2024-03-17 NOTE — DISCHARGE INSTRUCTIONS
Your incisional wounds are healing well. I am discharging you with diclofenac for pain and lidocaine cream that you can apply around your incision. You are also being discharged with stool softener for constipation.    Follow up with Dr. Brown in 2-3 days for wound check especially if these medications are not enough to manage your pain.    Return to the Emergency Department if you develop any uncontrollable fever, intractable pain, nausea, vomiting.

## 2024-03-17 NOTE — ED NOTES
Patient complaining of pain at her right surgical site. Patient states the bruising has gotten worse and the area is stinging.

## 2024-03-22 ENCOUNTER — HOSPITAL ENCOUNTER (EMERGENCY)
Facility: HOSPITAL | Age: 44
Discharge: HOME | End: 2024-03-22
Attending: EMERGENCY MEDICINE
Payer: MEDICAID

## 2024-03-22 VITALS
RESPIRATION RATE: 17 BRPM | SYSTOLIC BLOOD PRESSURE: 128 MMHG | BODY MASS INDEX: 45.99 KG/M2 | DIASTOLIC BLOOD PRESSURE: 81 MMHG | HEART RATE: 73 BPM | OXYGEN SATURATION: 96 % | HEIGHT: 67 IN | TEMPERATURE: 99.5 F | WEIGHT: 293 LBS

## 2024-03-22 DIAGNOSIS — I10 HYPERTENSION, UNSPECIFIED TYPE: ICD-10-CM

## 2024-03-22 DIAGNOSIS — R51.9 ACUTE NONINTRACTABLE HEADACHE, UNSPECIFIED HEADACHE TYPE: Primary | ICD-10-CM

## 2024-03-22 LAB
ALBUMIN SERPL-MCNC: 4.2 G/DL (ref 3.5–5.2)
ALBUMIN/GLOB SERPL: 1.2 G/DL
ALP SERPL-CCNC: 82 U/L (ref 39–117)
ALT SERPL W P-5'-P-CCNC: 32 U/L (ref 1–33)
ANION GAP SERPL CALCULATED.3IONS-SCNC: 11.7 MMOL/L (ref 5–15)
AST SERPL-CCNC: 20 U/L (ref 1–32)
BASOPHILS # BLD AUTO: 0.04 10*3/MM3 (ref 0–0.2)
BASOPHILS NFR BLD AUTO: 0.4 % (ref 0–1.5)
BILIRUB SERPL-MCNC: 0.4 MG/DL (ref 0–1.2)
BUN SERPL-MCNC: 9 MG/DL (ref 6–20)
BUN/CREAT SERPL: 14.3 (ref 7–25)
CALCIUM SPEC-SCNC: 9.2 MG/DL (ref 8.6–10.5)
CHLORIDE SERPL-SCNC: 105 MMOL/L (ref 98–107)
CO2 SERPL-SCNC: 22.3 MMOL/L (ref 22–29)
CREAT SERPL-MCNC: 0.63 MG/DL (ref 0.57–1)
DEPRECATED RDW RBC AUTO: 40.4 FL (ref 37–54)
EGFRCR SERPLBLD CKD-EPI 2021: 113 ML/MIN/1.73
EOSINOPHIL # BLD AUTO: 0.3 10*3/MM3 (ref 0–0.4)
EOSINOPHIL NFR BLD AUTO: 3 % (ref 0.3–6.2)
ERYTHROCYTE [DISTWIDTH] IN BLOOD BY AUTOMATED COUNT: 12.4 % (ref 12.3–15.4)
GLOBULIN UR ELPH-MCNC: 3.6 GM/DL
GLUCOSE SERPL-MCNC: 157 MG/DL (ref 65–99)
HCT VFR BLD AUTO: 43.7 % (ref 34–46.6)
HGB BLD-MCNC: 14.6 G/DL (ref 12–15.9)
HOLD SPECIMEN: NORMAL
HOLD SPECIMEN: NORMAL
IMM GRANULOCYTES # BLD AUTO: 0.03 10*3/MM3 (ref 0–0.05)
IMM GRANULOCYTES NFR BLD AUTO: 0.3 % (ref 0–0.5)
LYMPHOCYTES # BLD AUTO: 3.38 10*3/MM3 (ref 0.7–3.1)
LYMPHOCYTES NFR BLD AUTO: 34.1 % (ref 19.6–45.3)
MCH RBC QN AUTO: 29.7 PG (ref 26.6–33)
MCHC RBC AUTO-ENTMCNC: 33.4 G/DL (ref 31.5–35.7)
MCV RBC AUTO: 88.8 FL (ref 79–97)
MONOCYTES # BLD AUTO: 0.58 10*3/MM3 (ref 0.1–0.9)
MONOCYTES NFR BLD AUTO: 5.9 % (ref 5–12)
NEUTROPHILS NFR BLD AUTO: 5.57 10*3/MM3 (ref 1.7–7)
NEUTROPHILS NFR BLD AUTO: 56.3 % (ref 42.7–76)
NRBC BLD AUTO-RTO: 0 /100 WBC (ref 0–0.2)
PLATELET # BLD AUTO: 256 10*3/MM3 (ref 140–450)
PMV BLD AUTO: 11.1 FL (ref 6–12)
POTASSIUM SERPL-SCNC: 3.8 MMOL/L (ref 3.5–5.2)
PROT SERPL-MCNC: 7.8 G/DL (ref 6–8.5)
RBC # BLD AUTO: 4.92 10*6/MM3 (ref 3.77–5.28)
SODIUM SERPL-SCNC: 139 MMOL/L (ref 136–145)
WBC NRBC COR # BLD AUTO: 9.9 10*3/MM3 (ref 3.4–10.8)
WHOLE BLOOD HOLD COAG: NORMAL
WHOLE BLOOD HOLD SPECIMEN: NORMAL

## 2024-03-22 PROCEDURE — 96374 THER/PROPH/DIAG INJ IV PUSH: CPT

## 2024-03-22 PROCEDURE — 80053 COMPREHEN METABOLIC PANEL: CPT

## 2024-03-22 PROCEDURE — 99283 EMERGENCY DEPT VISIT LOW MDM: CPT

## 2024-03-22 PROCEDURE — 25810000003 SODIUM CHLORIDE 0.9 % SOLUTION

## 2024-03-22 PROCEDURE — 85025 COMPLETE CBC W/AUTO DIFF WBC: CPT

## 2024-03-22 PROCEDURE — 25010000002 METOCLOPRAMIDE PER 10 MG

## 2024-03-22 PROCEDURE — 96375 TX/PRO/DX INJ NEW DRUG ADDON: CPT

## 2024-03-22 PROCEDURE — 25010000002 DIPHENHYDRAMINE PER 50 MG

## 2024-03-22 RX ORDER — ACETAMINOPHEN 500 MG
1000 TABLET ORAL ONCE
Status: COMPLETED | OUTPATIENT
Start: 2024-03-22 | End: 2024-03-22

## 2024-03-22 RX ORDER — METOCLOPRAMIDE HYDROCHLORIDE 5 MG/ML
10 INJECTION INTRAMUSCULAR; INTRAVENOUS ONCE
Status: COMPLETED | OUTPATIENT
Start: 2024-03-22 | End: 2024-03-22

## 2024-03-22 RX ORDER — HYDROCHLOROTHIAZIDE 12.5 MG/1
12.5 TABLET ORAL DAILY
Qty: 14 TABLET | Refills: 0 | Status: SHIPPED | OUTPATIENT
Start: 2024-03-22 | End: 2024-04-05

## 2024-03-22 RX ORDER — DIPHENHYDRAMINE HYDROCHLORIDE 50 MG/ML
12.5 INJECTION INTRAMUSCULAR; INTRAVENOUS ONCE
Status: COMPLETED | OUTPATIENT
Start: 2024-03-22 | End: 2024-03-22

## 2024-03-22 RX ADMIN — DIPHENHYDRAMINE HYDROCHLORIDE 12.5 MG: 50 INJECTION, SOLUTION INTRAMUSCULAR; INTRAVENOUS at 20:20

## 2024-03-22 RX ADMIN — SODIUM CHLORIDE 1000 ML: 9 INJECTION, SOLUTION INTRAVENOUS at 20:19

## 2024-03-22 RX ADMIN — ACETAMINOPHEN 1000 MG: 500 TABLET ORAL at 20:19

## 2024-03-22 RX ADMIN — METOCLOPRAMIDE HYDROCHLORIDE 10 MG: 5 INJECTION INTRAMUSCULAR; INTRAVENOUS at 20:20

## 2024-03-23 NOTE — ED PROVIDER NOTES
Time: 8:51 PM EDT  Date of encounter:  3/22/2024  Independent Historian/Clinical History and Information was obtained by:   Patient    History is limited by: N/A    Chief Complaint: hypertension      History of Present Illness:  Patient is a 44 y.o. year old female who presents to the emergency department for evaluation of hypertension and headache. Started yesterday. Patient had a recent laparoscopic surgery. Was seen in the ED recently for incisional pain and wound check. She states that her wounds are healing well and not causing her any pain. Denies any fever, chills, cough, soa.    HPI    Patient Care Team  Primary Care Provider: Maris Gordillo APRN    Past Medical History:     No Known Allergies  Past Medical History:   Diagnosis Date    Allergic     Asthma     Fibroid     Hypertension      Past Surgical History:   Procedure Laterality Date     SECTION      ENDOMETRIAL ABLATION      FULGURATION ENDOMETRIOSIS N/A 3/15/2024    Procedure: DIAGNOSTIC LAPARASCOPY;  Surgeon: Jolly Brown DO;  Location: [facility] OR;  Service: Gynecology;  Laterality: N/A;    LAPAROSCOPIC TUBAL LIGATION Bilateral      Family History   Problem Relation Age of Onset    Breast cancer Neg Hx     Uterine cancer Neg Hx     Ovarian cancer Neg Hx     Colon cancer Neg Hx     Malig Hyperthermia Neg Hx        Home Medications:  Prior to Admission medications    Medication Sig Start Date End Date Taking? Authorizing Provider   clobetasol prop emollient base (Clobetasol Propionate E) 0.05 % emollient cream Apply 1 application  topically to the appropriate area as directed 2 (Two) Times a Day. 23   Maris Gordillo APRN   diclofenac (VOLTAREN) 75 MG EC tablet Take 1 tablet by mouth 2 (Two) Times a Day for 7 days. 3/17/24 3/24/24  Keenan Leonard PA   docusate sodium (COLACE) 100 MG capsule Take 1 capsule by mouth 2 (Two) Times a Day for 5 days. 3/17/24 3/22/24  Keenan Leonard PA   HYDROcodone-acetaminophen (Norco)  "5-325 MG per tablet Take 1 tablet by mouth Every 6 (Six) Hours As Needed for Moderate Pain. 3/15/24   Jolly Brown DO   ibuprofen (ADVIL,MOTRIN) 800 MG tablet Take 1 tablet by mouth Every 6 (Six) Hours As Needed for Mild Pain.    Provider, MD Ju   lidocaine (XYLOCAINE) 5 % ointment Apply 1 Application topically to the appropriate area as directed Every 2 (Two) Hours As Needed for Mild Pain. 3/17/24   Keenan Leonard PA   triamcinolone (KENALOG) 0.1 % cream Apply 1 application  topically to the appropriate area as directed 2 (Two) Times a Day. 23   Gama Ayers APRN        Social History:   Social History     Tobacco Use    Smoking status: Former     Current packs/day: 0.00     Average packs/day: 0.1 packs/day for 10.0 years (1.0 ttl pk-yrs)     Types: Cigarettes     Start date:      Quit date:      Years since quittin.2    Smokeless tobacco: Never   Vaping Use    Vaping status: Never Used   Substance Use Topics    Alcohol use: Not Currently    Drug use: Never         Review of Systems:  Review of Systems   Constitutional:  Negative for chills and fever.   HENT:  Negative for ear pain.    Eyes:  Negative for pain.   Respiratory:  Negative for cough and shortness of breath.    Cardiovascular:  Negative for chest pain.   Gastrointestinal:  Negative for abdominal pain, diarrhea, nausea and vomiting.   Genitourinary:  Negative for dysuria.   Musculoskeletal:  Negative for arthralgias.   Skin:  Negative for rash.   Neurological:  Positive for headaches.        Physical Exam:  /81   Pulse 73   Temp 99.5 °F (37.5 °C) (Oral)   Resp 17   Ht 170.2 cm (67\")   Wt (!) 141 kg (310 lb 3 oz)   SpO2 96%   BMI 48.58 kg/m²     Physical Exam  Vitals and nursing note reviewed.   Constitutional:       Appearance: Normal appearance.   HENT:      Head: Normocephalic and atraumatic.      Nose: Nose normal.   Eyes:      Extraocular Movements: Extraocular movements intact.      Conjunctiva/sclera: " Conjunctivae normal.      Pupils: Pupils are equal, round, and reactive to light.   Cardiovascular:      Rate and Rhythm: Normal rate and regular rhythm.      Heart sounds: Normal heart sounds.   Pulmonary:      Effort: Pulmonary effort is normal.      Breath sounds: Normal breath sounds.   Abdominal:      General: Abdomen is flat.      Palpations: Abdomen is soft.   Musculoskeletal:         General: Normal range of motion.      Cervical back: Normal range of motion and neck supple.   Skin:     General: Skin is warm and dry.   Neurological:      General: No focal deficit present.      Mental Status: She is alert and oriented to person, place, and time.   Psychiatric:         Mood and Affect: Mood normal.         Behavior: Behavior normal.                  Procedures:  Procedures      Medical Decision Making:      Comorbidities that affect care:    None    External Notes reviewed:    Previous ED Note: Reviewed ED note on 3/17/24      The following orders were placed and all results were independently analyzed by me:  Orders Placed This Encounter   Procedures    Comprehensive Metabolic Panel    [place] Draw    CBC Auto Differential    CBC & Differential    Green Top (Gel)    Lavender Top    Gold Top - SST    Light Blue Top       Medications Given in the Emergency Department:  Medications   acetaminophen (TYLENOL) tablet 1,000 mg (1,000 mg Oral Given 3/22/24 2019)   sodium chloride 0.9 % bolus 1,000 mL (1,000 mL Intravenous New Bag 3/22/24 2019)   metoclopramide (REGLAN) injection 10 mg (10 mg Intravenous Given 3/22/24 2020)   diphenhydrAMINE (BENADRYL) injection 12.5 mg (12.5 mg Intravenous Given 3/22/24 2020)        ED Course:         Labs:    Lab Results (last 24 hours)       Procedure Component Value Units Date/Time    CBC & Differential [[id number]]  (Abnormal) Collected: 03/22/24 1841    Specimen: Blood Updated: 03/22/24 1850    Narrative:      The following orders were created for panel order CBC &  Differential.  Procedure                               Abnormality         Status                     ---------                               -----------         ------                     CBC Auto Differential[[id number]]        Abnormal            Final result                 Please view results for these tests on the individual orders.    Comprehensive Metabolic Panel [[id number]]  (Abnormal) Collected: 03/22/24 1841    Specimen: Blood Updated: 03/22/24 1913     Glucose 157 mg/dL      BUN 9 mg/dL      Creatinine 0.63 mg/dL      Sodium 139 mmol/L      Potassium 3.8 mmol/L      Comment: Slight hemolysis detected by analyzer. Result may be falsely elevated.        Chloride 105 mmol/L      CO2 22.3 mmol/L      Calcium 9.2 mg/dL      Total Protein 7.8 g/dL      Albumin 4.2 g/dL      ALT (SGPT) 32 U/L      AST (SGOT) 20 U/L      Alkaline Phosphatase 82 U/L      Total Bilirubin 0.4 mg/dL      Globulin 3.6 gm/dL      A/G Ratio 1.2 g/dL      BUN/Creatinine Ratio 14.3     Anion Gap 11.7 mmol/L      eGFR 113.0 mL/min/1.73     Narrative:      GFR Normal >60  Chronic Kidney Disease <60  Kidney Failure <15      CBC Auto Differential [[id number]]  (Abnormal) Collected: 03/22/24 1841    Specimen: Blood Updated: 03/22/24 1850     WBC 9.90 10*3/mm3      RBC 4.92 10*6/mm3      Hemoglobin 14.6 g/dL      Hematocrit 43.7 %      MCV 88.8 fL      MCH 29.7 pg      MCHC 33.4 g/dL      RDW 12.4 %      RDW-SD 40.4 fl      MPV 11.1 fL      Platelets 256 10*3/mm3      Neutrophil % 56.3 %      Lymphocyte % 34.1 %      Monocyte % 5.9 %      Eosinophil % 3.0 %      Basophil % 0.4 %      Immature Grans % 0.3 %      Neutrophils, Absolute 5.57 10*3/mm3      Lymphocytes, Absolute 3.38 10*3/mm3      Monocytes, Absolute 0.58 10*3/mm3      Eosinophils, Absolute 0.30 10*3/mm3      Basophils, Absolute 0.04 10*3/mm3      Immature Grans, Absolute 0.03 10*3/mm3      nRBC 0.0 /100 WBC              Imaging:    No Radiology Exams Resulted Within Past 24  Hours      Differential Diagnosis and Discussion:    Headache: Differential diagnosis includes but is not limited to migraine, cluster headache, hypertension, tumor, subarachnoid bleeding, pseudotumor cerebri, temporal arteritis, infections, tension headache, and TMJ syndrome.    All labs were reviewed and interpreted by me.    MDM     Amount and/or Complexity of Data Reviewed  Clinical lab tests: reviewed    Risk of Complications, Morbidity, and/or Mortality  Presenting problems: moderate  Diagnostic procedures: low  Management options: low    Patient Progress  Patient progress: stable    Patient presents with hypertension.  States that her blood pressure has gone up as high as 190/100 at home.    The patient´s CBC that was reviewed and interpreted by me shows no abnormalities of critical concern. Of note, there is no anemia requiring a blood transfusion and the platelet count is acceptable.  The patient´s CMP that was reviewed and interpretted by me shows no abnormalities of critical concern. Of note, the patient´s sodium and potassium are acceptable. The patient´s liver enzymes are unremarkable. The patient´s renal function (creatinine) is preserved. The patient has a normal anion gap.    Patient's blood pressure has gone down to 128/81 while being in the ED. Denies any chest pain.     The patient presented to the emergency department with a headache. The patient is now resting comfortably in feels better, is alert, talkative, interactive and in no distress after ED treatment. The patient appears well and is able to tolerate PO fluids. Repeat examination is unremarkable and benign. The patient is neurologically intact, has a normal mental status, and this ambulatory in the ED. The history, exam, diagnostic testing (if any) and the patient's current condition do not suggest meningitis, stroke, sepsis, subarachnoid hemorrhage, intracranial bleeding, encephalitis, temporal arteritis or other significant pathology to  warrant further testing, continued ED treatment, admission, neurological consultation, for other specialist evaluation at this point. The vital signs have been stable. The patient's condition is stable and appropriate for discharge. The patient will pursue further outpatient evaluation with the primary care physician or other designated or consulting physician as indicated in the discharge instructions.             Patient Care Considerations:    ANTIBIOTICS: I considered prescribing antibiotics as an outpatient however no bacterial focus of infection was found.      Consultants/Shared Management Plan:    None    Social Determinants of Health:    Patient is independent, reliable, and has access to care.       Disposition and Care Coordination:    Discharged: The patient is suitable and stable for discharge with no need for consideration of admission.    I have explained the patient´s condition, diagnoses and treatment plan based on the information available to me at this time. I have answered questions and addressed any concerns. The patient has a good  understanding of the patient´s diagnosis, condition, and treatment plan as can be expected at this point. The vital signs have been stable. The patient´s condition is stable and appropriate for discharge from the emergency department.      The patient will pursue further outpatient evaluation with the primary care physician or other designated or consulting physician as outlined in the discharge instructions. They are agreeable to this plan of care and follow-up instructions have been explained in detail. The patient has received these instructions in written format and has expressed an understanding of the discharge instructions. The patient is aware that any significant change in condition or worsening of symptoms should prompt an immediate return to this or the closest emergency department or call to 911.  I have explained discharge medications and the need for  follow up with the patient/caretakers. This was also printed in the discharge instructions. Patient was discharged with the following medications and follow up:      Medication List        New Prescriptions      hydroCHLOROthiazide 12.5 MG tablet  Take 1 tablet by mouth Daily for 14 days.               Where to Get Your Medications        These medications were sent to [address], [place] - [address] [place] [address] - [phone]  - [phone]   [address] [facility], [place] [address]      Phone: [phone]   hydroCHLOROthiazide 12.5 MG tablet      No follow-up provider specified.     Final diagnoses:   Acute nonintractable headache, unspecified headache type   Hypertension, unspecified type        ED Disposition       ED Disposition   Discharge    Condition   Stable    Comment   --               This medical record created using voice recognition software.             Keenan Leonard PA  03/22/24 1277

## 2024-03-23 NOTE — DISCHARGE INSTRUCTIONS
Your blood pressure has stabilized in the ED. Your hypertension may be related to your pain. I am discharging you with HCTZ. You can take this daily. Check your blood pressure before taking. If your blood pressure is below 120/80, hold off on your medication.    Continue checking your blood pressure twice a day. Follow up with your PCP in 5-7 days.    Return to the Emergency Department if you develop any uncontrollable fever, intractable pain, nausea, vomiting.

## 2024-03-25 NOTE — PROGRESS NOTES
"Post Operative Visit        Chief Complaint   Patient presents with    Post-op     DIAGNOSTIC LAPARASCOPY    Headaches       HPI:   Jessenia Platt is here for a post op visit.  She had a diagnostic laparoscopy and has no complaints.  We have gone over her pathology and any available pictures and all questions have been answered.    Pain:  Yes  Vaginal bleeding:  No  Vaginal discharge:  No  Fever/chills:  No  Good appetite:  Yes  Normal bladder function:  Yes  Normal bowel function:  No  Hot flashes: No    PHYSICAL EXAM:  /99   Pulse 66   Ht 170.2 cm (67\")   Wt 133 kg (293 lb)   Breastfeeding No   BMI 45.89 kg/m²   General- NAD, alert and oriented, appropriate  Psych- Normal mood, good memory  Abdomen- Soft, non distended, non tender, no masses  Incisions-  Clean, dry, intact, healing well   Lymphatic- No palpable groin nodes  Ext- No edema, no cyanosis   Skin- No lesions, no rashes        ASSESSMENT and PLAN:  Post-operative exam    Diagnoses and all orders for this visit:    1. Post-operative state (Primary)        Operative report, surgical findings and any pathology/photos reviewed.  All questions answered.     Counseling:   Ok to resume normal activities  Ok to resume intercourse  TRACK MENSES, RTO if <q21 days (frequent) or >q3mo (infrequent IF not on hormonal BC), >7d long, heavy, or painful.      Follow Up:  Return in about 1 year (around 3/26/2025) for Annual exam.    I spent 20 minutes on the separately reported service of Post op visit. This time is not included in the time used to support the E/M service also reported today.        Jolly Brown DO  03/26/2024    [facility]  [address] [place] [address]  Dept: [phone]  Dept Fax: [phone]  Loc: [phone]  Loc Fax: [phone]     "

## 2024-03-26 ENCOUNTER — OFFICE VISIT (OUTPATIENT)
Dept: OBSTETRICS AND GYNECOLOGY | Facility: CLINIC | Age: 44
End: 2024-03-26
Payer: MEDICAID

## 2024-03-26 VITALS
HEIGHT: 67 IN | HEART RATE: 66 BPM | BODY MASS INDEX: 45.99 KG/M2 | WEIGHT: 293 LBS | SYSTOLIC BLOOD PRESSURE: 152 MMHG | DIASTOLIC BLOOD PRESSURE: 99 MMHG

## 2024-03-26 DIAGNOSIS — Z98.890 POST-OPERATIVE STATE: Primary | ICD-10-CM

## 2024-03-26 PROCEDURE — 99024 POSTOP FOLLOW-UP VISIT: CPT | Performed by: OBSTETRICS & GYNECOLOGY

## 2024-03-26 PROCEDURE — 3080F DIAST BP >= 90 MM HG: CPT | Performed by: OBSTETRICS & GYNECOLOGY

## 2024-03-26 PROCEDURE — 3077F SYST BP >= 140 MM HG: CPT | Performed by: OBSTETRICS & GYNECOLOGY

## 2024-03-28 ENCOUNTER — OFFICE VISIT (OUTPATIENT)
Dept: FAMILY MEDICINE CLINIC | Facility: CLINIC | Age: 44
End: 2024-03-28
Payer: MEDICAID

## 2024-03-28 VITALS
HEIGHT: 67 IN | OXYGEN SATURATION: 97 % | HEART RATE: 77 BPM | DIASTOLIC BLOOD PRESSURE: 92 MMHG | TEMPERATURE: 97.8 F | SYSTOLIC BLOOD PRESSURE: 168 MMHG | BODY MASS INDEX: 45.99 KG/M2 | WEIGHT: 293 LBS

## 2024-03-28 DIAGNOSIS — I10 PRIMARY HYPERTENSION: Primary | ICD-10-CM

## 2024-03-28 DIAGNOSIS — R51.9 ACUTE INTRACTABLE HEADACHE, UNSPECIFIED HEADACHE TYPE: ICD-10-CM

## 2024-03-28 PROCEDURE — 1160F RVW MEDS BY RX/DR IN RCRD: CPT

## 2024-03-28 PROCEDURE — 1159F MED LIST DOCD IN RCRD: CPT

## 2024-03-28 PROCEDURE — 3077F SYST BP >= 140 MM HG: CPT

## 2024-03-28 PROCEDURE — 99214 OFFICE O/P EST MOD 30 MIN: CPT

## 2024-03-28 PROCEDURE — 3080F DIAST BP >= 90 MM HG: CPT

## 2024-03-28 RX ORDER — LISINOPRIL 10 MG/1
10 TABLET ORAL DAILY
Qty: 30 TABLET | Refills: 2 | Status: SHIPPED | OUTPATIENT
Start: 2024-03-28

## 2024-03-28 NOTE — PROGRESS NOTES
Chief Complaint  Chief Complaint   Patient presents with    Hypertension     Pt seen in ER on 2024 for hypertension. Pt stated BP reading at home was 190/100 before going to ER.       Subjective      Jessenia Platt presents to [facility]    Jessenia Platt 44-year-old female who comes in today with concerns of elevated blood pressures. She was seen in the ER on  and initiated on antihypertensives. Blood pressure today is elevated at 168/92 mmHg    Her blood pressure was not high on her last visit in this clinic. She had a Pap smear here in 2023 and after that she was having pain. She went to the emergency room and was told to follow up with gynecology. She ended up seeing a gynecologist who believed she had endometriosis. She had an ablation done 7 years ago. She was recommended to have a laparoscopy. She had a laparoscopy done on 03/15/2024 and ever since her blood pressure has been elevated. She is sore now, but she has moments when she has pain. She went to the emergency room because her blood pressure was so high. She was initiated on hydrochlorothiazide 12.5 mg in the ER. Her head still hurts. With the headache she has pain in the back of her eyes. When she went to ER she had blurry vision and was unable to open her eyes. Her lowest diastolic number went down to 81mmHg, but it did not last long. Her blood pressure reading usually is 149/90 mmHg. She checks her blood pressure at home with a wrist cuff. She denies any edema in her lower extremities or feet. She denies any chest pain or palpitations. She denies any dyspnea.  Years ago, she was on blood pressure medication but was later weaned off it, she does not recall the name.    Objective     Medical History:  Past Medical History:   Diagnosis Date    Allergic     Asthma     Fibroid     Hypertension      Past Surgical History:   Procedure Laterality Date     SECTION      ENDOMETRIAL ABLATION       FULGURATION ENDOMETRIOSIS N/A 3/15/2024    Procedure: DIAGNOSTIC LAPARASCOPY;  Surgeon: Jolly Brown DO;  Location: [facility] OR;  Service: Gynecology;  Laterality: N/A;    LAPAROSCOPIC TUBAL LIGATION Bilateral       Social History     Tobacco Use    Smoking status: Former     Current packs/day: 0.00     Average packs/day: 0.1 packs/day for 10.0 years (1.0 ttl pk-yrs)     Types: Cigarettes     Start date:      Quit date:      Years since quittin.2    Smokeless tobacco: Never   Vaping Use    Vaping status: Never Used   Substance Use Topics    Alcohol use: Not Currently    Drug use: Never     Family History   Problem Relation Age of Onset    Breast cancer Neg Hx     Uterine cancer Neg Hx     Ovarian cancer Neg Hx     Colon cancer Neg Hx     Malig Hyperthermia Neg Hx        Medications:  Prior to Admission medications    Medication Sig Start Date End Date Taking? Authorizing Provider   hydroCHLOROthiazide 12.5 MG tablet Take 1 tablet by mouth Daily for 14 days. 3/22/24 4/5/24 Yes Keenan Leonard PA   ibuprofen (ADVIL,MOTRIN) 800 MG tablet Take 1 tablet by mouth Every 6 (Six) Hours As Needed for Mild Pain.   Yes Provider, MD Ju   triamcinolone (KENALOG) 0.1 % cream Apply 1 application  topically to the appropriate area as directed 2 (Two) Times a Day. 23  Yes Gama Ayers APRN   clobetasol prop emollient base (Clobetasol Propionate E) 0.05 % emollient cream Apply 1 application  topically to the appropriate area as directed 2 (Two) Times a Day. 23   Maris Gordillo APRN   HYDROcodone-acetaminophen (Norco) 5-325 MG per tablet Take 1 tablet by mouth Every 6 (Six) Hours As Needed for Moderate Pain. 3/15/24   Jolly Brown DO   lidocaine (XYLOCAINE) 5 % ointment Apply 1 Application topically to the appropriate area as directed Every 2 (Two) Hours As Needed for Mild Pain. 3/17/24   Keenan Leonard PA        Allergies:   Patient has no known allergies.    Health Maintenance Due  "  Topic Date Due    COVID-19 Vaccine (3 - 2023-24 season) 09/01/2023         Vital Signs:   /92 (BP Location: Left arm, Patient Position: Sitting, Cuff Size: Adult)   Pulse 77   Temp 97.8 °F (36.6 °C) (Oral)   Ht 170.2 cm (67\")   Wt 135 kg (296 lb 14.4 oz)   SpO2 97%   BMI 46.50 kg/m²     Wt Readings from Last 3 Encounters:   03/28/24 135 kg (296 lb 14.4 oz)   03/26/24 133 kg (293 lb)   03/22/24 (!) 141 kg (310 lb 3 oz)     BP Readings from Last 3 Encounters:   03/28/24 168/92   03/26/24 152/99   03/22/24 128/81       Physical Exam  Vitals reviewed.   Constitutional:       Appearance: Normal appearance. She is well-developed. She is morbidly obese.   HENT:      Head: Normocephalic and atraumatic.   Eyes:      Conjunctiva/sclera: Conjunctivae normal.      Pupils: Pupils are equal, round, and reactive to light.   Cardiovascular:      Rate and Rhythm: Normal rate and regular rhythm.      Heart sounds: No murmur heard.     No friction rub. No gallop.   Pulmonary:      Effort: Pulmonary effort is normal.      Breath sounds: Normal breath sounds. No wheezing or rhonchi.   Abdominal:      General: Bowel sounds are normal. There is no distension.      Palpations: Abdomen is soft.      Tenderness: There is no abdominal tenderness.   Musculoskeletal:      Right lower leg: No edema.      Left lower leg: No edema.   Skin:     General: Skin is warm and dry.   Neurological:      Mental Status: She is alert and oriented to person, place, and time.      Cranial Nerves: No cranial nerve deficit.   Psychiatric:         Mood and Affect: Mood and affect normal.         Behavior: Behavior normal.         Thought Content: Thought content normal.         Judgment: Judgment normal.         Result Review :    The following data was reviewed by JANET Donahue on 03/28/24 at 15:31 EDT:    Common labs          7/7/2023    08:25 1/19/2024    13:51 3/22/2024    18:41   Common Labs   Glucose 131  132  157    BUN 17  9  9  "   Creatinine 0.63  0.58  0.63    Sodium 141  140  139    Potassium 4.2  3.7  3.8    Chloride 108  104  105    Calcium 9.6  9.8  9.2    Albumin 4.4  4.3  4.2    Total Bilirubin 0.3  0.3  0.4    Alkaline Phosphatase 81  78  82    AST (SGOT) 21  31  20    ALT (SGPT) 37  48  32    WBC 7.35  7.73  9.90    Hemoglobin 14.4  13.9  14.6    Hematocrit 42.6  41.4  43.7    Platelets 235  241  256    Total Cholesterol 200      Triglycerides 141      HDL Cholesterol 45      LDL Cholesterol  130      Hemoglobin A1C 5.60          US Non-ob Transvaginal    Result Date: 1/19/2024   No acute pelvic abnormality.  Multiple small hyperechoic nodules associated with the endometrium measuring up to 8 mm, which could represent small submucosal fibroids.   1.8 cm lobulated hypoechoic nonvascular lesion at the Caesarean section scar site anterior to the uterus.  Imaging findings are nonspecific and could represent a persistent postsurgical seroma or scar endometrioma.  Findings appear relatively stable from 2015 ultrasound     KENDALL SABA MD       Electronically Signed and Approved By: KENDALL SABA MD on 1/19/2024 at 15:59                        Assessment and Plan    Diagnoses and all orders for this visit:    1. Primary hypertension (Primary)  -     lisinopril (PRINIVIL,ZESTRIL) 10 MG tablet; Take 1 tablet by mouth Daily.  Dispense: 30 tablet; Refill: 2    2. Acute intractable headache, unspecified headache type         Elevated blood pressure  - She will be started on lisinopril 10 mg. She will continue taking hydrochlorothiazide 12.5 mg. She will continue to monitor her blood pressure at home. If her blood pressure does not improve, dose of  hydrochlorothiazide will be increased.    Follow-up  - The patient will follow up in 2 weeks for blood pressure monitoring.        Smoking Cessation:    Jessenia Platt  reports that she quit smoking about 22 years ago. Her smoking use included cigarettes. She started smoking about 32 years ago. She has  a 1 pack-year smoking history. She has never used smokeless tobacco.       Follow Up   Return in about 2 weeks (around 4/11/2024) for Recheck.  Patient was given instructions and counseling regarding her condition or for health maintenance advice. Please see specific information pulled into the AVS if appropriate.     Please note that portions of this note were completed with a voice recognition program.    Transcribed from ambient dictation for JANET Donahue by Arina Zuñiga.  03/28/24   14:30 EDT    Patient or patient representative verbalized consent to the visit recording.  I have personally performed the services described in this document as transcribed by the above individual, and it is both accurate and complete.

## 2024-04-11 ENCOUNTER — OFFICE VISIT (OUTPATIENT)
Dept: FAMILY MEDICINE CLINIC | Facility: CLINIC | Age: 44
End: 2024-04-11
Payer: MEDICAID

## 2024-04-11 VITALS
SYSTOLIC BLOOD PRESSURE: 156 MMHG | BODY MASS INDEX: 45.99 KG/M2 | OXYGEN SATURATION: 97 % | TEMPERATURE: 98 F | HEIGHT: 67 IN | WEIGHT: 293 LBS | DIASTOLIC BLOOD PRESSURE: 92 MMHG | HEART RATE: 64 BPM

## 2024-04-11 DIAGNOSIS — E66.01 CLASS 3 SEVERE OBESITY DUE TO EXCESS CALORIES WITH SERIOUS COMORBIDITY AND BODY MASS INDEX (BMI) OF 45.0 TO 49.9 IN ADULT: ICD-10-CM

## 2024-04-11 DIAGNOSIS — I10 PRIMARY HYPERTENSION: Primary | ICD-10-CM

## 2024-04-11 DIAGNOSIS — R73.09 ELEVATED GLUCOSE: ICD-10-CM

## 2024-04-11 LAB
ANION GAP SERPL CALCULATED.3IONS-SCNC: 10 MMOL/L (ref 5–15)
BUN SERPL-MCNC: 11 MG/DL (ref 6–20)
BUN/CREAT SERPL: 16.7 (ref 7–25)
CALCIUM SPEC-SCNC: 8.9 MG/DL (ref 8.6–10.5)
CHLORIDE SERPL-SCNC: 104 MMOL/L (ref 98–107)
CO2 SERPL-SCNC: 25 MMOL/L (ref 22–29)
CREAT SERPL-MCNC: 0.66 MG/DL (ref 0.57–1)
EGFRCR SERPLBLD CKD-EPI 2021: 111.8 ML/MIN/1.73
GLUCOSE SERPL-MCNC: 136 MG/DL (ref 65–99)
HBA1C MFR BLD: 5.8 % (ref 4.8–5.6)
POTASSIUM SERPL-SCNC: 4.2 MMOL/L (ref 3.5–5.2)
SODIUM SERPL-SCNC: 139 MMOL/L (ref 136–145)

## 2024-04-11 PROCEDURE — 83036 HEMOGLOBIN GLYCOSYLATED A1C: CPT

## 2024-04-11 PROCEDURE — 80048 BASIC METABOLIC PNL TOTAL CA: CPT

## 2024-04-11 RX ORDER — HYDROCHLOROTHIAZIDE 12.5 MG/1
12.5 TABLET ORAL DAILY
Qty: 90 TABLET | Refills: 0 | Status: SHIPPED | OUTPATIENT
Start: 2024-04-11

## 2024-04-11 RX ORDER — LISINOPRIL 20 MG/1
20 TABLET ORAL DAILY
Qty: 90 TABLET | Refills: 0 | Status: SHIPPED | OUTPATIENT
Start: 2024-04-11

## 2024-04-11 NOTE — PROGRESS NOTES
Chief Complaint  Chief Complaint   Patient presents with    Hypertension    Follow-up       Subjective      Jessenia Platt presents to [facility]  The patient is a 44-year-old female who comes in today for a 2-week follow-up on hypertension. She was previously running very hypertensive with associated headaches and had been seen in the ER with headaches and blurry vision. At that time, her blood pressures were running 170s over 90s to 100s. When she was seen here 2 weeks ago, her blood pressure was 168/92 mmHg. At that time, she was initiated on lisinopril 10 mg daily as she was already taking hydrochlorothiazide 12.5 mg daily. Today in clinic, her blood pressure is 156/92 mmHg which is slightly improved, but she reports that she has not yet taken her medications as she typically takes them in the afternoon and also, she has been monitoring her blood pressures at home and they typically run 140s over 80s.    The patient reports a persistent small hump on her back, which she perceives to be enlarging and causing discomfort. This hump is particularly noticeable during sleep and occasionally upon awakening, impacting her sleep posture. She has found some relief with the use of a neck pillow. She speculates that insulin resistance could be a contributing factor. Approximately 1 to 2 years ago, she was diagnosed with insulin resistance, however, subsequent laboratory studies did not confirm this diagnosis.    Over the past 3 years, the patient has been engaged in weight loss, which has resulted in fluctuations in her weight. Her lowest recorded weight was 279 pounds, but she has since regained weight. Despite engaging in 5 days of gym workouts and consuming smoothies, she has been unable to maintain weight loss. She denies any history of polycystic ovary syndrome (PCOS). She underwent an ablation procedure 7 years ago due to heavy, painful menstrual cycles and mood swings. She has  previously tried metformin. Post-surgery, she has reintroduced fruit juices into her diet.    Laboratory Studies  Glucose was high when last checked in the ER. Hemoglobin A1c was normal in 2023.      Objective     Medical History:  Past Medical History:   Diagnosis Date    Allergic     Asthma     Fibroid     Hypertension      Past Surgical History:   Procedure Laterality Date     SECTION      ENDOMETRIAL ABLATION      FULGURATION ENDOMETRIOSIS N/A 3/15/2024    Procedure: DIAGNOSTIC LAPARASCOPY;  Surgeon: Jolly Brown DO;  Location: [facility] MAIN OR;  Service: Gynecology;  Laterality: N/A;    LAPAROSCOPIC TUBAL LIGATION Bilateral       Social History     Tobacco Use    Smoking status: Former     Current packs/day: 0.00     Average packs/day: 0.1 packs/day for 10.0 years (1.0 ttl pk-yrs)     Types: Cigarettes     Start date:      Quit date:      Years since quittin.2    Smokeless tobacco: Never   Vaping Use    Vaping status: Never Used   Substance Use Topics    Alcohol use: Not Currently    Drug use: Never     Family History   Problem Relation Age of Onset    Breast cancer Neg Hx     Uterine cancer Neg Hx     Ovarian cancer Neg Hx     Colon cancer Neg Hx     Malig Hyperthermia Neg Hx        Medications:  Prior to Admission medications    Medication Sig Start Date End Date Taking? Authorizing Provider   hydroCHLOROthiazide 12.5 MG tablet Take 1 tablet by mouth Daily for 14 days. 3/22/24 4/11/24 Yes Keenan Leonard PA   lisinopril (PRINIVIL,ZESTRIL) 10 MG tablet Take 1 tablet by mouth Daily. 3/28/24  Yes Maris Gordillo APRN   triamcinolone (KENALOG) 0.1 % cream Apply 1 application  topically to the appropriate area as directed 2 (Two) Times a Day. 23  Yes Gama Ayers APRN   ibuprofen (ADVIL,MOTRIN) 800 MG tablet Take 1 tablet by mouth Every 6 (Six) Hours As Needed for Mild Pain.  Patient not taking: Reported on 2024    Provider, MD Ju        Allergies:   Patient has  "no known allergies.    Health Maintenance Due   Topic Date Due    COVID-19 Vaccine (3 - 2023-24 season) 09/01/2023         Vital Signs:   /92   Pulse 64   Temp 98 °F (36.7 °C)   Ht 170.2 cm (67\")   Wt 136 kg (299 lb 3.2 oz)   SpO2 97%   BMI 46.86 kg/m²     Wt Readings from Last 3 Encounters:   04/11/24 136 kg (299 lb 3.2 oz)   03/28/24 135 kg (296 lb 14.4 oz)   03/26/24 133 kg (293 lb)     BP Readings from Last 3 Encounters:   04/11/24 156/92   03/28/24 168/92   03/26/24 152/99       Physical Exam  Vitals reviewed.   Constitutional:       Appearance: Normal appearance. She is well-developed. She is morbidly obese.   HENT:      Head: Normocephalic and atraumatic.   Eyes:      Conjunctiva/sclera: Conjunctivae normal.      Pupils: Pupils are equal, round, and reactive to light.   Cardiovascular:      Rate and Rhythm: Normal rate and regular rhythm.      Heart sounds: No murmur heard.     No friction rub. No gallop.   Pulmonary:      Effort: Pulmonary effort is normal.      Breath sounds: Normal breath sounds. No wheezing or rhonchi.   Abdominal:      General: Bowel sounds are normal. There is no distension.      Palpations: Abdomen is soft.      Tenderness: There is no abdominal tenderness.   Musculoskeletal:      Right lower leg: No edema.      Left lower leg: No edema.   Skin:     General: Skin is warm and dry.          Neurological:      Mental Status: She is alert and oriented to person, place, and time.      Cranial Nerves: No cranial nerve deficit.   Psychiatric:         Mood and Affect: Mood and affect normal.         Behavior: Behavior normal.         Thought Content: Thought content normal.         Judgment: Judgment normal.         Result Review :    The following data was reviewed by JANET Donahue on 04/11/24 at 10:05 EDT:    Common labs          7/7/2023    08:25 1/19/2024    13:51 3/22/2024    18:41   Common Labs   Glucose 131  132  157    BUN 17  9  9    Creatinine 0.63  0.58  " 0.63    Sodium 141  140  139    Potassium 4.2  3.7  3.8    Chloride 108  104  105    Calcium 9.6  9.8  9.2    Albumin 4.4  4.3  4.2    Total Bilirubin 0.3  0.3  0.4    Alkaline Phosphatase 81  78  82    AST (SGOT) 21  31  20    ALT (SGPT) 37  48  32    WBC 7.35  7.73  9.90    Hemoglobin 14.4  13.9  14.6    Hematocrit 42.6  41.4  43.7    Platelets 235  241  256    Total Cholesterol 200      Triglycerides 141      HDL Cholesterol 45      LDL Cholesterol  130      Hemoglobin A1C 5.60          US Non-ob Transvaginal    Result Date: 1/19/2024   No acute pelvic abnormality.  Multiple small hyperechoic nodules associated with the endometrium measuring up to 8 mm, which could represent small submucosal fibroids.   1.8 cm lobulated hypoechoic nonvascular lesion at the Caesarean section scar site anterior to the uterus.  Imaging findings are nonspecific and could represent a persistent postsurgical seroma or scar endometrioma.  Findings appear relatively stable from 2015 ultrasound     KENDALL SABA MD       Electronically Signed and Approved By: KENDALL SABA MD on 1/19/2024 at 15:59                          Assessment and Plan    Diagnoses and all orders for this visit:    1. Primary hypertension (Primary)  -     hydroCHLOROthiazide 12.5 MG tablet; Take 1 tablet by mouth Daily.  Dispense: 90 tablet; Refill: 0  -     lisinopril (PRINIVIL,ZESTRIL) 20 MG tablet; Take 1 tablet by mouth Daily.  Dispense: 90 tablet; Refill: 0    2. Class 3 severe obesity due to excess calories with serious comorbidity and body mass index (BMI) of 45.0 to 49.9 in adult  -     Basic metabolic panel  -     Hemoglobin A1c    3. Elevated glucose  -     Basic metabolic panel  -     Hemoglobin A1c       Hypertension  The patient's blood pressure has shown improvement, albeit with potential for further improvement. The dosage of lisinopril will be increased from 10 mg to 20 mg, while the hydrochlorothiazide will be refilled. The patient is advised to take  her blood pressure medication in the morning.    Back hump  The hump does not appear to be indicative of a lipoma. It is plausible that insulin resistance may be a contributing factor. The patient is advised to continue using a neck pillow. Labs to be repeated today. In the future she  may benefit from cortisol testing.     Weight management  The patient's thyroid levels were within normal range in 07/2024. However, her glucose levels were elevated when last checked in the ER. Her hemoglobin A1c was normal in 07/2024. A recheck of her glucose and A1c will be conducted to rule out diabetes. Various weight loss options were discussed, including the use of metformin to suppress appetite and facilitate weight loss. Bariatric surgery was also discussed. The patient was encouraged to use [employer] Formula E destin for accountability purposes. If her glucose levels remain elevated, metformin may be considered.        Follow Up   Return in about 2 months (around 6/11/2024) for Next scheduled follow up.  Patient was given instructions and counseling regarding her condition or for health maintenance advice. Please see specific information pulled into the AVS if appropriate.     Please note that portions of this note were completed with a voice recognition program.    Transcribed from ambient dictation for JANET Donahue by Kate Ley.  04/11/24   09:54 EDT    Patient or patient representative verbalized consent to the visit recording.  I have personally performed the services described in this document as transcribed by the above individual, and it is both accurate and complete.

## 2024-04-12 DIAGNOSIS — R73.03 PREDIABETES: Primary | ICD-10-CM

## 2024-04-12 RX ORDER — METFORMIN HYDROCHLORIDE 500 MG/1
TABLET, EXTENDED RELEASE ORAL
Qty: 180 TABLET | Refills: 0 | Status: SHIPPED | OUTPATIENT
Start: 2024-04-12 | End: 2024-05-10

## 2024-06-25 ENCOUNTER — OFFICE VISIT (OUTPATIENT)
Dept: FAMILY MEDICINE CLINIC | Facility: CLINIC | Age: 44
End: 2024-06-25
Payer: MEDICAID

## 2024-06-25 VITALS
WEIGHT: 291.5 LBS | TEMPERATURE: 97.7 F | SYSTOLIC BLOOD PRESSURE: 130 MMHG | HEIGHT: 67 IN | DIASTOLIC BLOOD PRESSURE: 78 MMHG | BODY MASS INDEX: 45.75 KG/M2 | OXYGEN SATURATION: 98 % | HEART RATE: 73 BPM

## 2024-06-25 DIAGNOSIS — L30.9 ECZEMA, UNSPECIFIED TYPE: ICD-10-CM

## 2024-06-25 DIAGNOSIS — R73.03 PREDIABETES: ICD-10-CM

## 2024-06-25 DIAGNOSIS — L30.9 DERMATITIS: ICD-10-CM

## 2024-06-25 DIAGNOSIS — I10 PRIMARY HYPERTENSION: Primary | ICD-10-CM

## 2024-06-25 PROCEDURE — 3078F DIAST BP <80 MM HG: CPT

## 2024-06-25 PROCEDURE — 1159F MED LIST DOCD IN RCRD: CPT

## 2024-06-25 PROCEDURE — 1160F RVW MEDS BY RX/DR IN RCRD: CPT

## 2024-06-25 PROCEDURE — 3075F SYST BP GE 130 - 139MM HG: CPT

## 2024-06-25 PROCEDURE — 99214 OFFICE O/P EST MOD 30 MIN: CPT

## 2024-06-25 PROCEDURE — 96372 THER/PROPH/DIAG INJ SC/IM: CPT

## 2024-06-25 RX ORDER — TRIAMCINOLONE ACETONIDE 40 MG/ML
40 INJECTION, SUSPENSION INTRA-ARTICULAR; INTRAMUSCULAR ONCE
Status: COMPLETED | OUTPATIENT
Start: 2024-06-25 | End: 2024-06-25

## 2024-06-25 RX ORDER — BETAMETHASONE DIPROPIONATE 0.5 MG/G
1 CREAM TOPICAL 2 TIMES DAILY
Qty: 45 G | Refills: 2 | Status: SHIPPED | OUTPATIENT
Start: 2024-06-25

## 2024-06-25 RX ADMIN — TRIAMCINOLONE ACETONIDE 40 MG: 40 INJECTION, SUSPENSION INTRA-ARTICULAR; INTRAMUSCULAR at 09:27

## 2024-06-25 NOTE — PROGRESS NOTES
Chief Complaint  Chief Complaint   Patient presents with    Hypertension    Elevated glucose reading     Pt started on metformin at last visit    Eczema     Pt states the 2 creams she has been given has not helped, has gotten increasingly worse       Subjective      Jessenia Platt presents to [facility]  History of Present Illness  The patient presents for evaluation of multiple medical concerns.    The patient continues to monitor her blood pressure at home, which has shown improvement. She reports experiencing less frequent headaches. Her current medication regimen includes lisinopril 20 mg and hydrochlorothiazide 12.5 mg, both of which appear to be effective.    The patient's blood glucose levels were slightly elevated, A1c was elevated and considered to be prediabetes so patient was initiated on metformin therapy. She reports satisfactory management of this medication, with no reported nausea or diarrhea.  She is up to maintenance dose of 1000 mg daily.  She has been losing weight recently, approximately 10 pounds since she was last seen.    The patient reports intermittent skin eruptions usually on her lower extremities but also occasionally on her back, which sometimes cause pruritus. These lesions have been present for approximately one month. The lesions are intermittent, with periods of improvement followed by a re-emergence. Despite daily application of Clobetasol cream and coconut oil, the rash persists. The lesions are described as dry and scaly. She has not observed any correlation between seasonal changes or stress. Over the past two years, the rash had been localized to her elbows and treated as eczema with topical corticosteroids including triamcinolone and most recently clobetasol.  Elbows appear to be improving but lower extremities reveal erythematous annular patches.      Objective     Medical History:  Past Medical History:   Diagnosis Date    Allergic      Asthma     Fibroid     Hypertension     Obesity 2010     Past Surgical History:   Procedure Laterality Date     SECTION      ENDOMETRIAL ABLATION      FULGURATION ENDOMETRIOSIS N/A 3/15/2024    Procedure: DIAGNOSTIC LAPARASCOPY;  Surgeon: Jolly Brown DO;  Location: [facility] MAIN OR;  Service: Gynecology;  Laterality: N/A;    LAPAROSCOPIC TUBAL LIGATION Bilateral       Social History     Tobacco Use    Smoking status: Former     Current packs/day: 0.00     Average packs/day: 0.1 packs/day for 10.0 years (1.0 ttl pk-yrs)     Types: Cigarettes     Start date:      Quit date:      Years since quittin.4    Smokeless tobacco: Never   Vaping Use    Vaping status: Never Used   Substance Use Topics    Alcohol use: Not Currently    Drug use: Never     Family History   Problem Relation Age of Onset    Hyperlipidemia Mother     Diabetes Father     Hyperlipidemia Father     Breast cancer Neg Hx     Uterine cancer Neg Hx     Ovarian cancer Neg Hx     Colon cancer Neg Hx     Malig Hyperthermia Neg Hx        Medications:  Prior to Admission medications    Medication Sig Start Date End Date Taking? Authorizing Provider   hydroCHLOROthiazide 12.5 MG tablet Take 1 tablet by mouth Daily. 24  Yes Maris Gordillo APRN   lisinopril (PRINIVIL,ZESTRIL) 20 MG tablet Take 1 tablet by mouth Daily. 24  Yes Maris Gordillo APRN   metFORMIN ER (GLUCOPHAGE-XR) 500 MG 24 hr tablet Take 1 tablet by mouth Daily With Breakfast for 7 days, THEN 2 tablets Daily With Breakfast for 21 days. 4/12/24 5/10/24  Maris Gordillo APRN   triamcinolone (KENALOG) 0.1 % cream Apply 1 application  topically to the appropriate area as directed 2 (Two) Times a Day. 23   Gama Ayers APRN        Allergies:   Patient has no known allergies.    Health Maintenance Due   Topic Date Due    MAMMOGRAM  Never done    COVID-19 Vaccine (3 - 2023-24 season) 2023         Vital Signs:   /78 (BP Location:  "Left arm, Patient Position: Sitting, Cuff Size: Adult)   Pulse 73   Temp 97.7 °F (36.5 °C) (Oral)   Ht 170.2 cm (67\")   Wt 132 kg (291 lb 8 oz)   SpO2 98%   BMI 45.66 kg/m²     Wt Readings from Last 3 Encounters:   06/25/24 132 kg (291 lb 8 oz)   04/11/24 136 kg (299 lb 3.2 oz)   03/28/24 135 kg (296 lb 14.4 oz)     BP Readings from Last 3 Encounters:   06/25/24 130/78   04/11/24 156/92   03/28/24 168/92       Physical Exam  Vitals reviewed.   Constitutional:       Appearance: Normal appearance. She is well-developed. She is morbidly obese.   HENT:      Head: Normocephalic and atraumatic.   Eyes:      Conjunctiva/sclera: Conjunctivae normal.      Pupils: Pupils are equal, round, and reactive to light.   Cardiovascular:      Rate and Rhythm: Normal rate and regular rhythm.      Heart sounds: No murmur heard.     No friction rub. No gallop.   Pulmonary:      Effort: Pulmonary effort is normal.      Breath sounds: Normal breath sounds. No wheezing or rhonchi.   Abdominal:      General: Bowel sounds are normal. There is no distension.      Palpations: Abdomen is soft.      Tenderness: There is no abdominal tenderness.   Skin:     General: Skin is warm and dry.      Findings: Lesion and rash present. Rash is macular and scaling.             Comments: Annular erythematous areas to outer aspect of left lower extremity.   Neurological:      Mental Status: She is alert and oriented to person, place, and time.      Cranial Nerves: No cranial nerve deficit.   Psychiatric:         Mood and Affect: Mood and affect normal.         Behavior: Behavior normal.         Thought Content: Thought content normal.         Judgment: Judgment normal.       Physical Exam        Result Review :    The following data was reviewed by JANET Donahue on 06/25/24 at 11:21 EDT:    Common labs          1/19/2024    13:51 3/22/2024    18:41 4/11/2024    09:04   Common Labs   Glucose 132  157  136    BUN 9  9  11    Creatinine 0.58  " 0.63  0.66    Sodium 140  139  139    Potassium 3.7  3.8  4.2    Chloride 104  105  104    Calcium 9.8  9.2  8.9    Albumin 4.3  4.2     Total Bilirubin 0.3  0.4     Alkaline Phosphatase 78  82     AST (SGOT) 31  20     ALT (SGPT) 48  32     WBC 7.73  9.90     Hemoglobin 13.9  14.6     Hematocrit 41.4  43.7     Platelets 241  256     Hemoglobin A1C   5.80          No Images in the past 120 days found..    Results  Laboratory Studies  Blood sugars were a little bit high, prediabetes.               Assessment and Plan    Diagnoses and all orders for this visit:    1. Primary hypertension (Primary)    2. Prediabetes    3. Eczema, unspecified type  -     triamcinolone acetonide (KENALOG-40) injection 40 mg  -     betamethasone dipropionate (DIPROSONE) 0.05 % cream; Apply 1 Application topically to the appropriate area as directed 2 (Two) Times a Day.  Dispense: 45 g; Refill: 2    4. Dermatitis  -     triamcinolone acetonide (KENALOG-40) injection 40 mg  -     betamethasone dipropionate (DIPROSONE) 0.05 % cream; Apply 1 Application topically to the appropriate area as directed 2 (Two) Times a Day.  Dispense: 45 g; Refill: 2       Assessment & Plan  1. Hypertension.  The patient's blood pressure has shown improvement.  Continue current medication regimen.  Continue monitoring blood pressures at home.    2. Prediabetes.  A refill of metformin will be provided.  Continue current medication regimen.  Continue making dietary modifications and increasing physical activity as tolerated.    3. Rash.  Rash is annular in nature, slightly pruritic and erythematous.  Kenalog injection will be administered today.  Betamethasone cream will be prescribed. I am unsure of the etiology of the rash.  It may be eczema or mild psoriasis.  If steroid course is not beneficial, patient to be referred over to dermatology.          Smoking Cessation:    Jessenia Platt  reports that she quit smoking about 22 years ago. Her smoking use included  cigarettes. She started smoking about 32 years ago. She has a 1 pack-year smoking history. She has never used smokeless tobacco.        Follow Up   Return in about 3 months (around 9/25/2024) for Next scheduled follow up.  Patient was given instructions and counseling regarding her condition or for health maintenance advice. Please see specific information pulled into the AVS if appropriate.     Please note that portions of this note were completed with a voice recognition program.    Patient or patient representative verbalized consent for the use of Ambient Listening during the visit with  JANET Donahue for chart documentation. 6/25/2024  11:21 EDT

## 2024-07-05 DIAGNOSIS — R73.03 PREDIABETES: ICD-10-CM

## 2024-07-05 RX ORDER — METFORMIN HYDROCHLORIDE 500 MG/1
TABLET, EXTENDED RELEASE ORAL
Qty: 180 TABLET | Refills: 0 | Status: SHIPPED | OUTPATIENT
Start: 2024-07-05

## 2024-08-05 ENCOUNTER — OFFICE VISIT (OUTPATIENT)
Dept: FAMILY MEDICINE CLINIC | Facility: CLINIC | Age: 44
End: 2024-08-05
Payer: MEDICAID

## 2024-08-05 VITALS
HEART RATE: 89 BPM | BODY MASS INDEX: 45.99 KG/M2 | TEMPERATURE: 98.6 F | OXYGEN SATURATION: 96 % | WEIGHT: 293 LBS | HEIGHT: 67 IN | DIASTOLIC BLOOD PRESSURE: 84 MMHG | SYSTOLIC BLOOD PRESSURE: 142 MMHG

## 2024-08-05 DIAGNOSIS — J39.2 THROAT DRY: ICD-10-CM

## 2024-08-05 DIAGNOSIS — H65.112 SUBACUTE ALLERGIC OTITIS MEDIA OF LEFT EAR, RECURRENCE NOT SPECIFIED: ICD-10-CM

## 2024-08-05 DIAGNOSIS — R09.89 RUNNY NOSE: Primary | ICD-10-CM

## 2024-08-05 DIAGNOSIS — J45.20 MILD INTERMITTENT ASTHMA WITHOUT COMPLICATION: ICD-10-CM

## 2024-08-05 LAB
EXPIRATION DATE: NORMAL
EXPIRATION DATE: NORMAL
FLUAV AG UPPER RESP QL IA.RAPID: NOT DETECTED
FLUBV AG UPPER RESP QL IA.RAPID: NOT DETECTED
INTERNAL CONTROL: NORMAL
INTERNAL CONTROL: NORMAL
Lab: NORMAL
Lab: NORMAL
S PYO AG THROAT QL: NEGATIVE
SARS-COV-2 AG UPPER RESP QL IA.RAPID: NOT DETECTED

## 2024-08-05 PROCEDURE — 3079F DIAST BP 80-89 MM HG: CPT | Performed by: FAMILY MEDICINE

## 2024-08-05 PROCEDURE — 87880 STREP A ASSAY W/OPTIC: CPT | Performed by: FAMILY MEDICINE

## 2024-08-05 PROCEDURE — 87428 SARSCOV & INF VIR A&B AG IA: CPT | Performed by: FAMILY MEDICINE

## 2024-08-05 PROCEDURE — 3077F SYST BP >= 140 MM HG: CPT | Performed by: FAMILY MEDICINE

## 2024-08-05 PROCEDURE — 99213 OFFICE O/P EST LOW 20 MIN: CPT | Performed by: FAMILY MEDICINE

## 2024-08-05 RX ORDER — AMOXICILLIN 500 MG/1
1000 CAPSULE ORAL 2 TIMES DAILY
Qty: 40 CAPSULE | Refills: 0 | Status: SHIPPED | OUTPATIENT
Start: 2024-08-05 | End: 2024-08-15

## 2024-08-05 RX ORDER — ALBUTEROL SULFATE 90 UG/1
2 AEROSOL, METERED RESPIRATORY (INHALATION) EVERY 4 HOURS PRN
Qty: 18 G | Refills: 0 | Status: SHIPPED | OUTPATIENT
Start: 2024-08-05 | End: 2024-09-04

## 2024-08-05 NOTE — PROGRESS NOTES
Chief Complaint  itchy throat, Earache, and Nasal Congestion (All since Saturday/)    Subjective        Jessenia Platt presents to [facility]  History of Present Illness  The patient presents for evaluation of multiple medical concerns.    The patient began experiencing an itchy throat last Friday, accompanied by sinus drainage. Concurrently, she has been experiencing sharp ear pain since Saturday. She has a history of recurrent ear infections, typically triggered by weather changes, but without associated fever. She has been self-medicating with over-the-counter Zyrtec. Today, she developed a dry cough, which is not associated with pain. She has not utilized an albuterol inhaler for the past 25 years. Her throat is slightly sore, but she denies excessive coughing during the day or at night. She also reports increased sneezing. She does not typically develop yeast infections with antibiotic use.    Supplemental Information  She has a history of asthma in her childhood. She did not take her blood pressure medication today.      Past Medical History:   Diagnosis Date    Allergic     Asthma     Fibroid     Hypertension     Obesity 2010      Family History   Problem Relation Age of Onset    Hyperlipidemia Mother     Diabetes Father     Hyperlipidemia Father     Breast cancer Neg Hx     Uterine cancer Neg Hx     Ovarian cancer Neg Hx     Colon cancer Neg Hx     Malig Hyperthermia Neg Hx       Social History     Socioeconomic History    Marital status:    Tobacco Use    Smoking status: Former     Current packs/day: 0.00     Average packs/day: 0.1 packs/day for 10.0 years (1.0 ttl pk-yrs)     Types: Cigarettes     Start date:      Quit date:      Years since quittin.6     Passive exposure: Past    Smokeless tobacco: Never   Vaping Use    Vaping status: Never Used   Substance and Sexual Activity    Alcohol use: Not Currently    Drug use: Never    Sexual activity: Not  "Currently     Partners: Male     Birth control/protection: Partner of same sex        Objective   Vital Signs:  /84 (BP Location: Left arm, Patient Position: Sitting, Cuff Size: Adult)   Pulse 89   Temp 98.6 °F (37 °C) (Oral)   Ht 170.2 cm (67\")   Wt 134 kg (294 lb 6.4 oz)   SpO2 96%   BMI 46.11 kg/m²   Estimated body mass index is 46.11 kg/m² as calculated from the following:    Height as of this encounter: 170.2 cm (67\").    Weight as of this encounter: 134 kg (294 lb 6.4 oz).     Class 3 Severe Obesity (BMI >=40). Obesity-related health conditions include the following: hypertension. Obesity is improving with lifestyle modifications. BMI is is above average; BMI management plan is completed. We discussed low calorie, low carb based diet program, portion control, and increasing exercise.    Review of Systems   Constitutional:  Negative for activity change, chills, fatigue and fever.   HENT:  Positive for congestion, sinus pressure and sore throat. Negative for sinus pain.    Eyes:  Negative for discharge and redness.   Respiratory:  Negative for cough and chest tightness.    Cardiovascular:  Negative for chest pain, palpitations and leg swelling.   Gastrointestinal:  Negative for abdominal pain and diarrhea.   Endocrine: Negative for cold intolerance and heat intolerance.   Genitourinary:  Negative for difficulty urinating and dysuria.   Musculoskeletal:  Negative for gait problem and neck stiffness.   Skin:  Negative for pallor and rash.   Neurological:  Negative for dizziness and headaches.   Psychiatric/Behavioral:  Negative for agitation, behavioral problems and confusion.       Physical Exam  Fluid noted behind the right ear, but no infection.  Lungs are clear.  Heart is normal.    Physical Exam  Vitals reviewed.   Constitutional:       Appearance: Normal appearance.   HENT:      Right Ear: Tympanic membrane normal.      Left Ear: Tympanic membrane normal.      Nose: Nose normal.   Eyes:      " Extraocular Movements: Extraocular movements intact.      Conjunctiva/sclera: Conjunctivae normal.      Pupils: Pupils are equal, round, and reactive to light.   Cardiovascular:      Rate and Rhythm: Normal rate and regular rhythm.   Pulmonary:      Effort: Pulmonary effort is normal.      Breath sounds: Normal breath sounds.   Abdominal:      General: Bowel sounds are normal.   Musculoskeletal:         General: Normal range of motion.      Cervical back: Normal range of motion.   Skin:     General: Skin is warm and dry.   Neurological:      General: No focal deficit present.      Mental Status: She is alert and oriented to person, place, and time.   Psychiatric:         Mood and Affect: Mood normal.         Behavior: Behavior normal.        Result Review       Results               Assessment and Plan     Diagnoses and all orders for this visit:    1. Runny nose (Primary)  -     POCT SARS-CoV-2 Antigen LACEY + Flu    2. Throat dry  -     POCT rapid strep A    3. Subacute allergic otitis media of left ear, recurrence not specified  -     amoxicillin (AMOXIL) 500 MG capsule; Take 2 capsules by mouth 2 (Two) Times a Day for 10 days.  Dispense: 40 capsule; Refill: 0    4. Mild intermittent asthma without complication  -     albuterol sulfate  (90 Base) MCG/ACT inhaler; Inhale 2 puffs Every 4 (Four) Hours As Needed for Wheezing for up to 30 days.  Dispense: 18 g; Refill: 0      Assessment & Plan  1. Sinus infection.  The patient's symptoms are indicative of a sinus infection. An albuterol inhaler will be prescribed. Additionally, amoxicillin will be prescribed for a duration of 10 days.       I spent 25 minutes caring for Jessenia on this date of service. This time includes time spent by me in the following activities:reviewing tests  Follow Up   Return if symptoms worsen or fail to improve.  Patient was given instructions and counseling regarding her condition or for health maintenance advice. Please see specific  information pulled into the AVS if appropriate.   Patient or patient representative verbalized consent for the use of Ambient Listening during the visit with  Anny Panchal MD for chart documentation. 8/19/2024  18:01 EDT    Anny Panchal MD

## 2024-09-25 ENCOUNTER — OFFICE VISIT (OUTPATIENT)
Dept: FAMILY MEDICINE CLINIC | Facility: CLINIC | Age: 44
End: 2024-09-25
Payer: MEDICAID

## 2024-09-25 VITALS
BODY MASS INDEX: 45.99 KG/M2 | TEMPERATURE: 98 F | DIASTOLIC BLOOD PRESSURE: 96 MMHG | WEIGHT: 293 LBS | HEART RATE: 64 BPM | SYSTOLIC BLOOD PRESSURE: 150 MMHG | HEIGHT: 67 IN | OXYGEN SATURATION: 96 %

## 2024-09-25 DIAGNOSIS — I10 PRIMARY HYPERTENSION: ICD-10-CM

## 2024-09-25 DIAGNOSIS — Z00.00 ANNUAL PHYSICAL EXAM: Primary | ICD-10-CM

## 2024-09-25 DIAGNOSIS — J45.20 MILD INTERMITTENT ASTHMA WITHOUT COMPLICATION: ICD-10-CM

## 2024-09-25 DIAGNOSIS — L40.9 PSORIASIS: ICD-10-CM

## 2024-09-25 DIAGNOSIS — Z23 NEED FOR INFLUENZA VACCINATION: ICD-10-CM

## 2024-09-25 DIAGNOSIS — R25.2 LEG CRAMPS: ICD-10-CM

## 2024-09-25 DIAGNOSIS — G25.81 RLS (RESTLESS LEGS SYNDROME): ICD-10-CM

## 2024-09-25 DIAGNOSIS — R73.03 PREDIABETES: ICD-10-CM

## 2024-09-25 DIAGNOSIS — Z12.31 ENCOUNTER FOR SCREENING MAMMOGRAM FOR MALIGNANT NEOPLASM OF BREAST: ICD-10-CM

## 2024-09-25 LAB
ALBUMIN SERPL-MCNC: 4.3 G/DL (ref 3.5–5.2)
ALBUMIN/GLOB SERPL: 1.3 G/DL
ALP SERPL-CCNC: 72 U/L (ref 39–117)
ALT SERPL W P-5'-P-CCNC: 30 U/L (ref 1–33)
ANION GAP SERPL CALCULATED.3IONS-SCNC: 9.5 MMOL/L (ref 5–15)
AST SERPL-CCNC: 22 U/L (ref 1–32)
BASOPHILS # BLD AUTO: 0.04 10*3/MM3 (ref 0–0.2)
BASOPHILS NFR BLD AUTO: 0.5 % (ref 0–1.5)
BILIRUB SERPL-MCNC: 0.4 MG/DL (ref 0–1.2)
BUN SERPL-MCNC: 11 MG/DL (ref 6–20)
BUN/CREAT SERPL: 17.7 (ref 7–25)
CALCIUM SPEC-SCNC: 9.5 MG/DL (ref 8.6–10.5)
CHLORIDE SERPL-SCNC: 105 MMOL/L (ref 98–107)
CHOLEST SERPL-MCNC: 177 MG/DL (ref 0–200)
CO2 SERPL-SCNC: 24.5 MMOL/L (ref 22–29)
CREAT SERPL-MCNC: 0.62 MG/DL (ref 0.57–1)
DEPRECATED RDW RBC AUTO: 38.7 FL (ref 37–54)
EGFRCR SERPLBLD CKD-EPI 2021: 113.5 ML/MIN/1.73
EOSINOPHIL # BLD AUTO: 0.23 10*3/MM3 (ref 0–0.4)
EOSINOPHIL NFR BLD AUTO: 2.9 % (ref 0.3–6.2)
ERYTHROCYTE [DISTWIDTH] IN BLOOD BY AUTOMATED COUNT: 11.9 % (ref 12.3–15.4)
FERRITIN SERPL-MCNC: 191 NG/ML (ref 13–150)
FOLATE SERPL-MCNC: 7.95 NG/ML (ref 4.78–24.2)
GLOBULIN UR ELPH-MCNC: 3.2 GM/DL
GLUCOSE SERPL-MCNC: 100 MG/DL (ref 65–99)
HBA1C MFR BLD: 5.3 % (ref 4.8–5.6)
HCT VFR BLD AUTO: 41.7 % (ref 34–46.6)
HDLC SERPL-MCNC: 42 MG/DL (ref 40–60)
HGB BLD-MCNC: 13.9 G/DL (ref 12–15.9)
IMM GRANULOCYTES # BLD AUTO: 0.02 10*3/MM3 (ref 0–0.05)
IMM GRANULOCYTES NFR BLD AUTO: 0.3 % (ref 0–0.5)
IRON 24H UR-MRATE: 94 MCG/DL (ref 37–145)
IRON SATN MFR SERPL: 25 % (ref 20–50)
LDLC SERPL CALC-MCNC: 111 MG/DL (ref 0–100)
LDLC/HDLC SERPL: 2.56 {RATIO}
LYMPHOCYTES # BLD AUTO: 2.59 10*3/MM3 (ref 0.7–3.1)
LYMPHOCYTES NFR BLD AUTO: 33.1 % (ref 19.6–45.3)
MCH RBC QN AUTO: 29.8 PG (ref 26.6–33)
MCHC RBC AUTO-ENTMCNC: 33.3 G/DL (ref 31.5–35.7)
MCV RBC AUTO: 89.3 FL (ref 79–97)
MONOCYTES # BLD AUTO: 0.44 10*3/MM3 (ref 0.1–0.9)
MONOCYTES NFR BLD AUTO: 5.6 % (ref 5–12)
NEUTROPHILS NFR BLD AUTO: 4.51 10*3/MM3 (ref 1.7–7)
NEUTROPHILS NFR BLD AUTO: 57.6 % (ref 42.7–76)
NRBC BLD AUTO-RTO: 0 /100 WBC (ref 0–0.2)
PLATELET # BLD AUTO: 244 10*3/MM3 (ref 140–450)
PMV BLD AUTO: 12.9 FL (ref 6–12)
POTASSIUM SERPL-SCNC: 4 MMOL/L (ref 3.5–5.2)
PROT SERPL-MCNC: 7.5 G/DL (ref 6–8.5)
RBC # BLD AUTO: 4.67 10*6/MM3 (ref 3.77–5.28)
SODIUM SERPL-SCNC: 139 MMOL/L (ref 136–145)
TIBC SERPL-MCNC: 377 MCG/DL (ref 298–536)
TRANSFERRIN SERPL-MCNC: 253 MG/DL (ref 200–360)
TRIGL SERPL-MCNC: 137 MG/DL (ref 0–150)
VIT B12 BLD-MCNC: 541 PG/ML (ref 211–946)
VLDLC SERPL-MCNC: 24 MG/DL (ref 5–40)
WBC NRBC COR # BLD AUTO: 7.83 10*3/MM3 (ref 3.4–10.8)

## 2024-09-25 PROCEDURE — 3077F SYST BP >= 140 MM HG: CPT

## 2024-09-25 PROCEDURE — 99396 PREV VISIT EST AGE 40-64: CPT

## 2024-09-25 PROCEDURE — 80061 LIPID PANEL: CPT

## 2024-09-25 PROCEDURE — 1159F MED LIST DOCD IN RCRD: CPT

## 2024-09-25 PROCEDURE — 82728 ASSAY OF FERRITIN: CPT

## 2024-09-25 PROCEDURE — 83540 ASSAY OF IRON: CPT

## 2024-09-25 PROCEDURE — 3080F DIAST BP >= 90 MM HG: CPT

## 2024-09-25 PROCEDURE — 83036 HEMOGLOBIN GLYCOSYLATED A1C: CPT

## 2024-09-25 PROCEDURE — 82607 VITAMIN B-12: CPT

## 2024-09-25 PROCEDURE — 90471 IMMUNIZATION ADMIN: CPT

## 2024-09-25 PROCEDURE — 1160F RVW MEDS BY RX/DR IN RCRD: CPT

## 2024-09-25 PROCEDURE — 80053 COMPREHEN METABOLIC PANEL: CPT

## 2024-09-25 PROCEDURE — 84466 ASSAY OF TRANSFERRIN: CPT

## 2024-09-25 PROCEDURE — 85025 COMPLETE CBC W/AUTO DIFF WBC: CPT

## 2024-09-25 PROCEDURE — 90656 IIV3 VACC NO PRSV 0.5 ML IM: CPT

## 2024-09-25 PROCEDURE — 82746 ASSAY OF FOLIC ACID SERUM: CPT

## 2024-09-25 RX ORDER — CLOBETASOL PROPIONATE 0.5 MG/G
1 OINTMENT TOPICAL 2 TIMES DAILY
Qty: 60 G | Refills: 1 | Status: SHIPPED | OUTPATIENT
Start: 2024-09-25

## 2024-09-25 RX ORDER — METHYLPREDNISOLONE 4 MG
TABLET, DOSE PACK ORAL
Qty: 21 TABLET | Refills: 0 | Status: SHIPPED | OUTPATIENT
Start: 2024-09-25

## 2024-09-25 RX ORDER — KETOCONAZOLE 20 MG/ML
SHAMPOO TOPICAL 2 TIMES WEEKLY
Qty: 120 ML | Refills: 1 | Status: SHIPPED | OUTPATIENT
Start: 2024-09-26

## 2024-10-16 ENCOUNTER — HOSPITAL ENCOUNTER (OUTPATIENT)
Dept: MAMMOGRAPHY | Facility: HOSPITAL | Age: 44
Discharge: HOME | End: 2024-10-16
Payer: MEDICAID

## 2024-10-16 DIAGNOSIS — Z12.31 ENCOUNTER FOR SCREENING MAMMOGRAM FOR MALIGNANT NEOPLASM OF BREAST: ICD-10-CM

## 2024-10-16 PROCEDURE — 77063 BREAST TOMOSYNTHESIS BI: CPT

## 2024-10-16 PROCEDURE — 77067 SCR MAMMO BI INCL CAD: CPT

## 2024-11-06 ENCOUNTER — OFFICE VISIT (OUTPATIENT)
Dept: FAMILY MEDICINE CLINIC | Facility: CLINIC | Age: 44
End: 2024-11-06
Payer: MEDICAID

## 2024-11-06 VITALS
HEART RATE: 76 BPM | HEIGHT: 67 IN | DIASTOLIC BLOOD PRESSURE: 89 MMHG | SYSTOLIC BLOOD PRESSURE: 142 MMHG | BODY MASS INDEX: 45.99 KG/M2 | TEMPERATURE: 98.3 F | OXYGEN SATURATION: 96 % | WEIGHT: 293 LBS

## 2024-11-06 DIAGNOSIS — L40.9 PSORIASIS: Primary | ICD-10-CM

## 2024-11-06 DIAGNOSIS — I10 PRIMARY HYPERTENSION: ICD-10-CM

## 2024-11-06 PROCEDURE — 3077F SYST BP >= 140 MM HG: CPT

## 2024-11-06 PROCEDURE — 3079F DIAST BP 80-89 MM HG: CPT

## 2024-11-06 PROCEDURE — 99214 OFFICE O/P EST MOD 30 MIN: CPT

## 2024-11-06 RX ORDER — APREMILAST 30 MG/1
30 TABLET, FILM COATED ORAL 2 TIMES DAILY
Qty: 60 TABLET | Refills: 2 | Status: SHIPPED | OUTPATIENT
Start: 2024-11-06

## 2024-11-06 RX ORDER — LISINOPRIL 20 MG/1
20 TABLET ORAL DAILY
Qty: 90 TABLET | Refills: 1 | Status: CANCELLED | OUTPATIENT
Start: 2024-11-06

## 2024-11-06 RX ORDER — LISINOPRIL 40 MG/1
40 TABLET ORAL DAILY
Qty: 90 TABLET | Refills: 1 | Status: SHIPPED | OUTPATIENT
Start: 2024-11-06

## 2024-11-06 RX ORDER — HYDROCHLOROTHIAZIDE 12.5 MG/1
12.5 TABLET ORAL DAILY
Qty: 90 TABLET | Refills: 1 | Status: SHIPPED | OUTPATIENT
Start: 2024-11-06

## 2024-11-06 RX ORDER — APREMILAST 30 MG/1
30 TABLET, FILM COATED ORAL 2 TIMES DAILY
Qty: 60 TABLET | Refills: 0 | COMMUNITY
Start: 2024-11-06

## 2024-11-06 NOTE — PROGRESS NOTES
"Chief Complaint  Chief Complaint   Patient presents with    Rash     Pt still having a rash on arms and legs. The rash has cleared up on her back from last visit.    Hair/Scalp Problem     Hair is \"shedding\" and thinning x 2-3 weeks. Scalp is itchy.       Subjective      Jessenia Platt presents to [facility]  History of Present Illness  The patient is a 44-year-old female who presents today with complaints of worsening psoriasis flareups.    She had previously been prescribed oral steroids and a topical corticosteroid, which provided brief relief. However, since stopping the oral steroids, her symptoms have continued. She has also noticed some hair loss around the psoriatic plaques on her scalp. She is currently on medication for her psoriasis but is experiencing difficulties with insurance coverage. Recently, she acquired a second insurance policy.    She is also on medication for high blood pressure, which she reports has improved. In response to high cholesterol levels, she has made dietary changes, including reducing salt intake and using Smart Balance butter. Additionally, she is considering weight loss injections and is seeking information about their availability.      Objective     Medical History:  Past Medical History:   Diagnosis Date    Allergic     Asthma     Fibroid     Hypertension     Obesity      Past Surgical History:   Procedure Laterality Date     SECTION      ENDOMETRIAL ABLATION      FULGURATION ENDOMETRIOSIS N/A 3/15/2024    Procedure: DIAGNOSTIC LAPARASCOPY;  Surgeon: Jolly Brown DO;  Location: [facility] MAIN OR;  Service: Gynecology;  Laterality: N/A;    LAPAROSCOPIC TUBAL LIGATION Bilateral       Social History     Tobacco Use    Smoking status: Former     Current packs/day: 0.00     Average packs/day: 0.1 packs/day for 10.0 years (1.0 ttl pk-yrs)     Types: Cigarettes     Start date: 1992     Quit date:      Years since quittin.8 " "    Passive exposure: Past    Smokeless tobacco: Never   Vaping Use    Vaping status: Never Used   Substance Use Topics    Alcohol use: Not Currently    Drug use: Never     Family History   Problem Relation Age of Onset    Hyperlipidemia Mother     Diabetes Father     Hyperlipidemia Father     Breast cancer Neg Hx     Uterine cancer Neg Hx     Ovarian cancer Neg Hx     Colon cancer Neg Hx     Malig Hyperthermia Neg Hx        Medications:  Prior to Admission medications    Medication Sig Start Date End Date Taking? Authorizing Provider   clobetasol (TEMOVATE) 0.05 % ointment Apply 1 Application topically to the appropriate area as directed 2 (Two) Times a Day. 9/25/24  Yes Maris Gordillo APRN   hydroCHLOROthiazide 12.5 MG tablet Take 1 tablet by mouth Daily. 4/11/24  Yes Maris Gordillo APRN   ketoconazole (NIZORAL) 2 % shampoo Apply  topically to the appropriate area as directed 2 (Two) Times a Week. 9/26/24  Yes Maris Gordillo APRN   lisinopril (PRINIVIL,ZESTRIL) 20 MG tablet Take 1 tablet by mouth Daily. 4/11/24  Yes Maris Gordillo APRN   metFORMIN ER (GLUCOPHAGE-XR) 500 MG 24 hr tablet TAKE 1 TABLET BY MOUTH ONCE DAILY WITH BREAKFAST FOR 7 DAYS THEN 2 ONCE DAILY WITH BREAKFAST 7/5/24  Yes Maris Gordillo APRN   methylPREDNISolone (MEDROL) 4 MG dose pack Take as directed on package instructions. 9/25/24   Maris Gordillo APRN        Allergies:   Patient has no known allergies.    Health Maintenance Due   Topic Date Due    COVID-19 Vaccine (3 - 2024-25 season) 09/01/2024         Vital Signs:   /89 (BP Location: Left arm, Patient Position: Sitting, Cuff Size: Adult)   Pulse 76   Temp 98.3 °F (36.8 °C) (Oral)   Ht 170.2 cm (67\")   Wt 133 kg (293 lb 6.4 oz)   SpO2 96%   BMI 45.95 kg/m²     Wt Readings from Last 3 Encounters:   11/06/24 133 kg (293 lb 6.4 oz)   09/25/24 134 kg (295 lb 1.6 oz)   08/05/24 134 kg (294 lb 6.4 oz)     BP Readings from Last 3 " Encounters:   11/06/24 142/89   09/25/24 150/96   08/05/24 142/84       Physical Exam  Vitals reviewed.   Constitutional:       Appearance: Normal appearance. She is well-developed. She is morbidly obese.   HENT:      Head: Normocephalic and atraumatic.   Eyes:      Conjunctiva/sclera: Conjunctivae normal.      Pupils: Pupils are equal, round, and reactive to light.   Cardiovascular:      Rate and Rhythm: Normal rate and regular rhythm.      Heart sounds: No murmur heard.     No friction rub. No gallop.   Pulmonary:      Effort: Pulmonary effort is normal.      Breath sounds: Normal breath sounds. No wheezing or rhonchi.   Abdominal:      General: Bowel sounds are normal. There is no distension.      Palpations: Abdomen is soft.      Tenderness: There is no abdominal tenderness.   Skin:     General: Skin is warm and dry.      Findings: Erythema, lesion and rash present. Rash is scaling.      Comments: Erythematous scaling rash to bilateral elbows, left lower extremity, lower back, scalp   Neurological:      Mental Status: She is alert and oriented to person, place, and time.      Cranial Nerves: No cranial nerve deficit.   Psychiatric:         Mood and Affect: Mood and affect normal.         Behavior: Behavior normal.         Thought Content: Thought content normal.         Judgment: Judgment normal.       Physical Exam        Result Review :    The following data was reviewed by JANET Donahue on 11/06/24 at 15:16 EST:    Common labs          3/22/2024    18:41 4/11/2024    09:04 9/25/2024    10:06   Common Labs   Glucose 157  136  100    BUN 9  11  11    Creatinine 0.63  0.66  0.62    Sodium 139  139  139    Potassium 3.8  4.2  4.0    Chloride 105  104  105    Calcium 9.2  8.9  9.5    Albumin 4.2   4.3    Total Bilirubin 0.4   0.4    Alkaline Phosphatase 82   72    AST (SGOT) 20   22    ALT (SGPT) 32   30    WBC 9.90   7.83    Hemoglobin 14.6   13.9    Hematocrit 43.7   41.7    Platelets 256   244     Total Cholesterol   177    Triglycerides   137    HDL Cholesterol   42    LDL Cholesterol    111    Hemoglobin A1C  5.80  5.30        Mammo Screening Digital Tomosynthesis Bilateral With CAD    Result Date: 10/17/2024  No mammographic evidence of malignancy.  Recommend annual screening mammography.  BI-RADS ASSESSMENT: Category 1: Negative  Note: It has been reported that there is approximately a 15% false negative rate in mammography.  Therefore, management of a palpable abnormality should not be deferred because of a negative mammogram.  Electronically Signed By-Maggie Stokes MD On:10/17/2024 3:21 PM        Results                 Assessment and Plan    Diagnoses and all orders for this visit:    1. Psoriasis (Primary)    2. Primary hypertension  -     hydroCHLOROthiazide 12.5 MG tablet; Take 1 tablet by mouth Daily.  Dispense: 90 tablet; Refill: 1  -     lisinopril (PRINIVIL,ZESTRIL) 40 MG tablet; Take 1 tablet by mouth Daily.  Dispense: 90 tablet; Refill: 1       Assessment & Plan  1. Psoriasis.  She reports worsening psoriasis flareups with some hair loss around the psoriatic plaques on her scalp. Previous treatments with oral steroids and topical corticosteroids provided only temporary relief.  I believe she would be a candidate for oral therapy.  A prescription for Otezla will be provided, starting with a taper-up regimen. She will take 10 mg and 20 mg tablets in the morning and evening for the first two weeks, then continue with 30 mg twice a day. Two packs will be given to maintain this dosage. Insurance coverage will be checked, and if there are issues, a referral to a dermatologist in [place] will be considered. Her lab results indicate no current issues with kidney function.    2. Hypertension.  Her blood pressure is elevated, possibly due to recent stress. She is currently on Lisinopril 20 mg and HCTZ 12.5 mg. The dosage of Lisinopril will be increased to 40 mg. She can start taking two 20 mg  tablets until the new prescription is filled. She is advised to continue monitoring her blood pressure and maintain her dietary changes, including reducing salt intake and using no-salt seasoning.    Follow-up  Return in 1 month for follow up.          Smoking Cessation:    Jessenia Platt  reports that she quit smoking about 22 years ago. Her smoking use included cigarettes. She started smoking about 32 years ago. She has a 1 pack-year smoking history. She has been exposed to tobacco smoke. She has never used smokeless tobacco.             Follow Up   Return in about 1 month (around 12/6/2024) for Recheck, Next scheduled follow up.  Patient was given instructions and counseling regarding her condition or for health maintenance advice. Please see specific information pulled into the AVS if appropriate.     Please note that portions of this note were completed with a voice recognition program.    Patient or patient representative verbalized consent for the use of Ambient Listening during the visit with  JANET Donahue for chart documentation. 11/6/2024  15:15 EST

## 2024-11-07 ENCOUNTER — PRIOR AUTHORIZATION (OUTPATIENT)
Dept: FAMILY MEDICINE CLINIC | Facility: CLINIC | Age: 44
End: 2024-11-07
Payer: COMMERCIAL

## 2024-11-07 NOTE — TELEPHONE ENCOUNTER
Otezla 30MG tablets [place] DENIAL     This request has not been approved. Based on the information we received, you did not meet the specific rule(s) needed to approve this request. In some cases, the requested drug or alternatives offered may have other guideline rules that need to be met. Our guideline named CYTOKINE AND CAM ANTAGONISTS requires the following rule(s) be met for approval:1. Therapy is prescribed by, or in consultation with, a dermatologist, rheumatologist, or other specialist in the treatment of psoriasis2. The member had 3-month trial and failure [drug did not work] of at least 1 of the following conventional therapies:a. Disease-modifying anti-rheumatic drug (DMARD)b. Immunosuppressantc. Oral retinoidThis is why your request is denied. Please work with your doctor to use a different medication or get us more information if it will allow us to approve this request. A written notification letter will follow with additional details.

## 2024-11-08 ENCOUNTER — TELEPHONE (OUTPATIENT)
Dept: FAMILY MEDICINE CLINIC | Facility: CLINIC | Age: 44
End: 2024-11-08
Payer: COMMERCIAL

## 2024-11-08 DIAGNOSIS — L40.9 PSORIASIS: Primary | ICD-10-CM

## 2024-11-08 NOTE — TELEPHONE ENCOUNTER
Patient will need to be referred to dermatology for Otezla as insurance will not cover if not by dermatology or rheumatology.

## 2024-11-15 DIAGNOSIS — L40.9 PSORIASIS: ICD-10-CM

## 2024-11-15 RX ORDER — APREMILAST 30 MG/1
30 TABLET, FILM COATED ORAL 2 TIMES DAILY
Qty: 180 TABLET | Refills: 1 | Status: SHIPPED | OUTPATIENT
Start: 2024-11-15

## 2024-11-19 DIAGNOSIS — L40.9 PSORIASIS: ICD-10-CM

## 2024-11-19 RX ORDER — APREMILAST 30 MG/1
30 TABLET, FILM COATED ORAL 2 TIMES DAILY
Qty: 180 TABLET | Refills: 1 | Status: SHIPPED | OUTPATIENT
Start: 2024-11-19

## 2024-11-19 NOTE — TELEPHONE ENCOUNTER
[facility] sent a fax stating they are not contracted with patient's insurance so the Otezla needs to be sent to [facility]. I will send the script electronically to them.

## 2024-12-10 ENCOUNTER — OFFICE VISIT (OUTPATIENT)
Dept: FAMILY MEDICINE CLINIC | Facility: CLINIC | Age: 44
End: 2024-12-10
Payer: COMMERCIAL

## 2024-12-10 VITALS
DIASTOLIC BLOOD PRESSURE: 80 MMHG | HEART RATE: 59 BPM | WEIGHT: 287.1 LBS | BODY MASS INDEX: 45.06 KG/M2 | SYSTOLIC BLOOD PRESSURE: 122 MMHG | TEMPERATURE: 97.8 F | HEIGHT: 67 IN | OXYGEN SATURATION: 97 %

## 2024-12-10 DIAGNOSIS — L40.9 PSORIASIS: ICD-10-CM

## 2024-12-10 DIAGNOSIS — I10 PRIMARY HYPERTENSION: Primary | ICD-10-CM

## 2024-12-10 PROCEDURE — 99214 OFFICE O/P EST MOD 30 MIN: CPT

## 2024-12-10 RX ORDER — CLOBETASOL PROPIONATE 0.5 MG/G
1 OINTMENT TOPICAL 2 TIMES DAILY
Qty: 60 G | Refills: 1 | Status: SHIPPED | OUTPATIENT
Start: 2024-12-10

## 2024-12-10 RX ORDER — KETOCONAZOLE 20 MG/ML
SHAMPOO, SUSPENSION TOPICAL 2 TIMES WEEKLY
Qty: 120 ML | Refills: 1 | Status: SHIPPED | OUTPATIENT
Start: 2024-12-12

## 2024-12-10 NOTE — PROGRESS NOTES
Chief Complaint  Chief Complaint   Patient presents with    Hypertension    Psoriasis     Pt states she is doing well on the medication. Her skin has been clearing up and not as itchy.       Subjective      Jessenia Platt presents to [facility]  History of Present Illness  The patient is a 44-year-old female who presents today for a 1-month follow-up on hypertension and psoriasis.    When last seen, she continued to be hypertensive, so her lisinopril dose was increased to 40 mg daily. Her blood pressure has improved, now at 122/80.    She had previously failed topical and oral steroids for psoriasis and was initiated on oral Otezla, which she reports is working well. Her skin condition has shown significant improvement, with the Otezla treatment proving effective. She has been receiving the medication through a specialty pharmacy, due to insurance requirements. The insurance initially did not cover the medication, but after contacting them, they agreed to cover a portion of the cost, with the remaining balance covered by her secondary insurance. She has been using the cream and shampoo as part of her treatment regimen.    She has been making dietary modifications, including switching to gluten-free bread, and continues to maintain a regular gym routine. She has experienced a weight loss of 6 pounds over the past month.    She discontinued metformin approximately 30 days ago and has never been diagnosed with diabetes, only prediabetes.    MEDICATIONS  Current: Lisinopril, HCTZ, Otezla  Discontinued: Metformin      Objective     Medical History:  Past Medical History:   Diagnosis Date    Allergic     Asthma     Fibroid     Hypertension     Obesity 2010     Past Surgical History:   Procedure Laterality Date     SECTION      ENDOMETRIAL ABLATION      FULGURATION ENDOMETRIOSIS N/A 3/15/2024    Procedure: DIAGNOSTIC LAPARASCOPY;  Surgeon: Jolly Brown DO;  Location: [facility]  OR;  Service: Gynecology;  Laterality: N/A;    LAPAROSCOPIC TUBAL LIGATION Bilateral       Social History     Tobacco Use    Smoking status: Former     Current packs/day: 0.00     Average packs/day: 0.1 packs/day for 10.0 years (1.0 ttl pk-yrs)     Types: Cigarettes     Start date: 1992     Quit date:      Years since quittin.9     Passive exposure: Past    Smokeless tobacco: Never   Vaping Use    Vaping status: Never Used   Substance Use Topics    Alcohol use: Not Currently    Drug use: Never     Family History   Problem Relation Age of Onset    Hyperlipidemia Mother     Diabetes Father     Hyperlipidemia Father     Breast cancer Neg Hx     Uterine cancer Neg Hx     Ovarian cancer Neg Hx     Colon cancer Neg Hx     Malig Hyperthermia Neg Hx        Medications:  Prior to Admission medications    Medication Sig Start Date End Date Taking? Authorizing Provider   Apremilast (Otezla) 30 MG tablet Take 30 mg by mouth 2 (Two) Times a Day. 24  Yes Maris Gordillo APRN   Apremilast (Otezla) 30 MG tablet Take 30 mg by mouth 2 (Two) Times a Day. 24  Yes Maris Gordillo APRN   clobetasol (TEMOVATE) 0.05 % ointment Apply 1 Application topically to the appropriate area as directed 2 (Two) Times a Day. 24  Yes Maris Gordillo APRN   hydroCHLOROthiazide 12.5 MG tablet Take 1 tablet by mouth Daily. 24  Yes Maris Gordillo APRN   ketoconazole (NIZORAL) 2 % shampoo Apply  topically to the appropriate area as directed 2 (Two) Times a Week. 24  Yes Maris Gordillo APRN   lisinopril (PRINIVIL,ZESTRIL) 40 MG tablet Take 1 tablet by mouth Daily. 24  Yes Maris Gordillo APRN   metFORMIN ER (GLUCOPHAGE-XR) 500 MG 24 hr tablet TAKE 1 TABLET BY MOUTH ONCE DAILY WITH BREAKFAST FOR 7 DAYS THEN 2 ONCE DAILY WITH BREAKFAST 24  Yes Maris Gordillo APRN        Allergies:   Patient has no known allergies.    Health Maintenance Due   Topic Date Due  "   COVID-19 Vaccine (3 - 2024-25 season) 09/01/2024         Vital Signs:   /80 (BP Location: Left arm, Patient Position: Sitting, Cuff Size: Adult)   Pulse 59   Temp 97.8 °F (36.6 °C) (Oral)   Ht 170.2 cm (67\")   Wt 130 kg (287 lb 1.6 oz)   SpO2 97%   BMI 44.97 kg/m²     Wt Readings from Last 3 Encounters:   12/10/24 130 kg (287 lb 1.6 oz)   11/06/24 133 kg (293 lb 6.4 oz)   09/25/24 134 kg (295 lb 1.6 oz)     BP Readings from Last 3 Encounters:   12/10/24 122/80   11/06/24 142/89   09/25/24 150/96     Physical Exam  Vitals reviewed.   Constitutional:       Appearance: Normal appearance. She is well-developed.   HENT:      Head: Normocephalic and atraumatic.   Eyes:      Conjunctiva/sclera: Conjunctivae normal.      Pupils: Pupils are equal, round, and reactive to light.   Cardiovascular:      Rate and Rhythm: Normal rate and regular rhythm.      Heart sounds: No murmur heard.     No friction rub. No gallop.   Pulmonary:      Effort: Pulmonary effort is normal.      Breath sounds: Normal breath sounds. No wheezing or rhonchi.   Abdominal:      General: Bowel sounds are normal. There is no distension.      Palpations: Abdomen is soft.      Tenderness: There is no abdominal tenderness.   Skin:     General: Skin is warm and dry.      Findings: Rash present. Rash is scaling.      Comments: Minimal scaling to bilateral elbows with decrease in erythema   Neurological:      Mental Status: She is alert and oriented to person, place, and time.      Cranial Nerves: No cranial nerve deficit.   Psychiatric:         Mood and Affect: Mood and affect normal.         Behavior: Behavior normal.         Thought Content: Thought content normal.         Judgment: Judgment normal.       Physical Exam  Vital Signs  Blood pressure is 122/80.      Result Review :    The following data was reviewed by JANET Donahue on 12/10/24 at 09:26 EST:    Common labs          3/22/2024    18:41 4/11/2024    09:04 9/25/2024    " 10:06   Common Labs   Glucose 157  136  100    BUN 9  11  11    Creatinine 0.63  0.66  0.62    Sodium 139  139  139    Potassium 3.8  4.2  4.0    Chloride 105  104  105    Calcium 9.2  8.9  9.5    Albumin 4.2   4.3    Total Bilirubin 0.4   0.4    Alkaline Phosphatase 82   72    AST (SGOT) 20   22    ALT (SGPT) 32   30    WBC 9.90   7.83    Hemoglobin 14.6   13.9    Hematocrit 43.7   41.7    Platelets 256   244    Total Cholesterol   177    Triglycerides   137    HDL Cholesterol   42    LDL Cholesterol    111    Hemoglobin A1C  5.80  5.30          Mammo Screening Digital Tomosynthesis Bilateral With CAD    Result Date: 10/17/2024  No mammographic evidence of malignancy.  Recommend annual screening mammography.  BI-RADS ASSESSMENT: Category 1: Negative  Note: It has been reported that there is approximately a 15% false negative rate in mammography.  Therefore, management of a palpable abnormality should not be deferred because of a negative mammogram.  Electronically Signed By-Maggie Stokes MD On:10/17/2024 3:21 PM        Results                 Assessment and Plan    Diagnoses and all orders for this visit:    1. Primary hypertension (Primary)    2. Psoriasis  -     clobetasol (TEMOVATE) 0.05 % ointment; Apply 1 Application topically to the appropriate area as directed 2 (Two) Times a Day.  Dispense: 60 g; Refill: 1  -     ketoconazole (NIZORAL) 2 % shampoo; Apply  topically to the appropriate area as directed 2 (Two) Times a Week.  Dispense: 120 mL; Refill: 1       Assessment & Plan  1. Hypertension.  Her blood pressure has shown significant improvement, currently at 122/80, compared to previous readings of 142/89, 150/96, and 142/84. She will continue her current medication regimen, which includes lisinopril 40 mg daily and HCTZ 12.5 mg.    2. Psoriasis.  Her skin condition has improved with the use of Otezla. Prescriptions for the clobetasol ointment and ketoconazole shampoo have been renewed. She has been  advised to ensure she has sufficient refills of Otezla from the specialty pharmacy which should last for 6 months.    3. Weight management.  She has lost 6 pounds in a month. She is encouraged to continue her current diet and exercise regimen.    4. Prediabetes.  Her glucose levels were slightly above normal, but her A1c was within the normal range. She has been prediabetic in the past but has never been diagnosed with diabetes. She stopped taking metformin about a month ago. Her A1c will be checked again in the future to monitor her condition.    Follow-up  The patient will follow up in 3 months.          Smoking Cessation:    Jessenia Platt  reports that she quit smoking about 22 years ago. Her smoking use included cigarettes. She started smoking about 32 years ago. She has a 1 pack-year smoking history. She has been exposed to tobacco smoke. She has never used smokeless tobacco.             Follow Up   Return in about 3 months (around 3/10/2025), or if symptoms worsen or fail to improve, for Next scheduled follow up.  Patient was given instructions and counseling regarding her condition or for health maintenance advice. Please see specific information pulled into the AVS if appropriate.     Please note that portions of this note were completed with a voice recognition program.    Patient or patient representative verbalized consent for the use of Ambient Listening during the visit with  JANET Donahue for chart documentation. 12/10/2024  09:25 EST

## 2025-01-22 ENCOUNTER — OFFICE VISIT (OUTPATIENT)
Dept: FAMILY MEDICINE CLINIC | Facility: CLINIC | Age: 45
End: 2025-01-22
Payer: COMMERCIAL

## 2025-01-22 VITALS
DIASTOLIC BLOOD PRESSURE: 82 MMHG | HEART RATE: 62 BPM | SYSTOLIC BLOOD PRESSURE: 146 MMHG | WEIGHT: 285.9 LBS | TEMPERATURE: 98.2 F | HEIGHT: 67 IN | BODY MASS INDEX: 44.87 KG/M2 | OXYGEN SATURATION: 98 %

## 2025-01-22 DIAGNOSIS — J02.9 SORE THROAT: ICD-10-CM

## 2025-01-22 DIAGNOSIS — R09.81 NASAL CONGESTION: ICD-10-CM

## 2025-01-22 DIAGNOSIS — J06.9 VIRAL URI WITH COUGH: Primary | ICD-10-CM

## 2025-01-22 RX ORDER — FLUTICASONE PROPIONATE 50 MCG
1 SPRAY, SUSPENSION (ML) NASAL DAILY PRN
Qty: 16 G | Refills: 0 | Status: SHIPPED | OUTPATIENT
Start: 2025-01-22 | End: 2025-02-21

## 2025-01-22 RX ORDER — BENZONATATE 100 MG/1
100 CAPSULE ORAL 3 TIMES DAILY PRN
Qty: 30 CAPSULE | Refills: 0 | Status: SHIPPED | OUTPATIENT
Start: 2025-01-22

## 2025-01-22 RX ORDER — CETIRIZINE HYDROCHLORIDE 10 MG/1
10 TABLET ORAL DAILY
Qty: 30 TABLET | Refills: 0 | Status: SHIPPED | OUTPATIENT
Start: 2025-01-22

## 2025-01-22 NOTE — PROGRESS NOTES
"Chief Complaint  Sore Throat, Nasal Congestion, and Earache    Subjective      Jessenia Platt is a 45 y.o. female who presents to [facility]     History of Present Illness  The patient is a 45-year-old female who presents today with complaints of sore throat, congestion, and ear discomfort. Rapid testing for influenza and COVID-19 has been completed and returned negative.    She reports the onset of her symptoms approximately 2 days ago, initially presenting as a sore throat, followed by nasal congestion and earache. She does not experience any cough, nausea, vomiting, diarrhea, fevers, or chills. She has not used any nasal steroids such as Flonase or Nasonex. She has previously used Zyrtec for allergy management. She has been self-medicating with Zicam.    She has a long-standing history of asthma since childhood, which has remained largely asymptomatic in her adult years.    Supplemental Information  She is on lisinopril and hydrochlorothiazide for blood pressure. She has psoriasis and uses Otezla, topical clobetasol ointment, and ketoconazole shampoo.    SOCIAL HISTORY  The patient quit smoking about 23 years ago.    MEDICATIONS  Current: lisinopril, hydrochlorothiazide, Otezla, clobetasol ointment, ketoconazole shampoo, Zicam        Patient Care Team:  Maris Gordillo APRN as PCP - General (Family Medicine)    Objective   Vital Signs:   Vitals:    01/22/25 1028   BP: 146/82   Pulse: 62   Temp: 98.2 °F (36.8 °C)   SpO2: 98%   Weight: 130 kg (285 lb 14.4 oz)   Height: 170.2 cm (67\")     Body mass index is 44.78 kg/m².    Wt Readings from Last 3 Encounters:   01/22/25 130 kg (285 lb 14.4 oz)   12/10/24 130 kg (287 lb 1.6 oz)   11/06/24 133 kg (293 lb 6.4 oz)     BP Readings from Last 3 Encounters:   01/22/25 146/82   12/10/24 122/80   11/06/24 142/89       Health Maintenance   Topic Date Due    COVID-19 Vaccine (3 - 2024-25 season) 09/01/2024    BMI FOLLOWUP  08/05/2025    " ANNUAL PHYSICAL  09/25/2025    MAMMOGRAM  10/16/2026    PAP SMEAR  12/22/2026    TDAP/TD VACCINES (2 - Td or Tdap) 09/22/2033    HEPATITIS C SCREENING  Completed    Pneumococcal Vaccine 0-64  Completed    INFLUENZA VACCINE  Completed       Lab Results (last 24 hours)       ** No results found for the last 24 hours. **               Physical Exam  Vitals and nursing note reviewed.   Constitutional:       General: She is not in acute distress.     Appearance: Normal appearance. She is not ill-appearing.   HENT:      Head: Normocephalic and atraumatic.      Right Ear: Tympanic membrane, ear canal and external ear normal. There is no impacted cerumen.      Left Ear: Tympanic membrane, ear canal and external ear normal. There is no impacted cerumen.      Nose: Congestion and rhinorrhea present.      Mouth/Throat:      Mouth: Mucous membranes are moist.      Pharynx: Posterior oropharyngeal erythema present. No oropharyngeal exudate.   Eyes:      General:         Right eye: No discharge.         Left eye: No discharge.      Extraocular Movements: Extraocular movements intact.      Conjunctiva/sclera: Conjunctivae normal.      Pupils: Pupils are equal, round, and reactive to light.   Cardiovascular:      Rate and Rhythm: Normal rate and regular rhythm.      Heart sounds: Normal heart sounds.   Pulmonary:      Effort: Pulmonary effort is normal.      Breath sounds: Normal breath sounds.   Lymphadenopathy:      Cervical: Cervical adenopathy (mild) present.   Skin:     General: Skin is warm and dry.   Neurological:      General: No focal deficit present.      Mental Status: She is alert and oriented to person, place, and time.   Psychiatric:         Mood and Affect: Mood normal.         Behavior: Behavior normal.          Physical Exam  Cervical lymph nodes are mildly irritated.  Lungs are clear.  Heart sounds are normal.      Result Review   The following data was reviewed by: Tameka Thomas MD on 01/22/2025:  [x]   Tests & Results  []  Hospitalization/Emergency Department/Urgent Care  []  Internal/External Consultant Notes    Results  Laboratory Studies  Rapid testing for influenza and COVID-19 are negative. Strep test is negative.      Procedures          ASSESSMENT/PLAN  Diagnoses and all orders for this visit:    1. Viral URI with cough (Primary)    2. Nasal congestion  -     POCT SARS-CoV-2 Antigen LACEY + Flu    3. Sore throat  -     POCT rapid strep A    Other orders  -     benzonatate (TESSALON) 100 MG capsule; Take 1 capsule by mouth 3 (Three) Times a Day As Needed for Cough for up to 30 doses.  Dispense: 30 capsule; Refill: 0  -     fluticasone (FLONASE) 50 MCG/ACT nasal spray; 1 spray by Each Nare route Daily As Needed for Rhinitis or Allergies for up to 30 days.  Dispense: 16 g; Refill: 0  -     cetirizine (zyrTEC) 10 MG tablet; Take 1 tablet by mouth Daily.  Dispense: 30 tablet; Refill: 0        Assessment & Plan  1. Viral upper respiratory infection.  Symptoms include sore throat, congestion, and ear discomfort, which started 2 days ago. Rapid testing for influenza and COVID-19 returned negative results. The infection is causing sinus inflammation and postnasal drainage, leading to the sore throat. A prescription for Tessalon Perles will be provided to manage the cough, especially at night. Over-the-counter Mucinex (guaifenesin) is recommended during the day to thin mucus, and she should drink plenty of water. Flonase will be prescribed to reduce nasal inflammation and improve drainage. Zyrtec is suggested to help dry up nasal secretions. She should continue using Zicam as it helps shorten the course of viral infections.    2. Asthma.  She has a history of asthma since childhood, which can flare up due to viral infections. An albuterol inhaler will be prescribed for use in case of wheezing or shortness of breath.                Jessenia Platt  reports that she quit smoking about 23 years ago. Her smoking use  included cigarettes. She started smoking about 33 years ago. She has a 1 pack-year smoking history. She has been exposed to tobacco smoke. She has never used smokeless tobacco.         FOLLOW UP  No follow-ups on file.  Patient was given instructions and counseling regarding her condition or for health maintenance advice. Please see specific information pulled into the AVS if appropriate.       Tameka Thomas MD  01/23/25  16:54 EST    Part of this note may be an electronic transcription/translation of spoken language to printed text using the Dragon Dictation System.    Patient or patient representative verbalized consent for the use of Ambient Listening during the visit with  Tameka Thomas MD for chart documentation. 1/23/2025  11:09 EST

## 2025-01-31 ENCOUNTER — TELEPHONE (OUTPATIENT)
Dept: FAMILY MEDICINE CLINIC | Facility: CLINIC | Age: 45
End: 2025-01-31
Payer: COMMERCIAL

## 2025-01-31 NOTE — TELEPHONE ENCOUNTER
Caller:   MATTY     Relationship:  PHARMACY     Best call back number:  [phone]     Who are you requesting to speak with (clinical staff, provider,  specific staff member):   CLINICAL     What was the call regarding:  Pharmacy is needing an update on this patient's PA for her Otezla.  Please call them and advise.

## 2025-02-04 ENCOUNTER — PRIOR AUTHORIZATION (OUTPATIENT)
Dept: FAMILY MEDICINE CLINIC | Facility: CLINIC | Age: 45
End: 2025-02-04
Payer: COMMERCIAL

## 2025-02-04 NOTE — TELEPHONE ENCOUNTER
I left voicemail for patient. I called [facility] pharmacy. Patient no longer has [place] so this pharmacy can't be used. She received an approval for the Otezla with her [facility] insurance so it will need to go to another pharmacy.

## 2025-02-04 NOTE — TELEPHONE ENCOUNTER
[employer] calling back to request documents be submitted to cover my meds.  Key# [id number]    This order is on hold until received. Please call them back when done.

## 2025-02-04 NOTE — TELEPHONE ENCOUNTER
Otezla 30MG tablets PA APPROVAL     Message from plan: The request has been approved. The authorization is effective from 01/31/2025 to 01/30/2026, as long as the member is enrolled in their current health plan. The request was reviewed and approved by a licensed clinical pharmacist. A written notification letter will follow with additional details.. Authorization Expiration Date: January 30, 2026.

## 2025-02-06 NOTE — TELEPHONE ENCOUNTER
Caller: Jessenia Platt    Relationship: Self    Best call back number: [phone]     What is the best time to reach you: ANY    Who are you requesting to speak with (clinical staff, provider,  specific staff member): CLINICAL STAFF    What was the call regarding: PATIENT CALLED BACK AND IS UNSURE OF WHAT PHARMACY WOULD BE ABLE TO FILL THE MEDICATION BUT SHE WOULD LIKE TO USE [facility] AGAIN IF POSSIBLE.    PHARMACY: [address] - [phone]  - [phone]  [phone]

## 2025-02-07 DIAGNOSIS — L40.9 PSORIASIS: ICD-10-CM

## 2025-02-07 RX ORDER — APREMILAST 30 MG/1
30 TABLET, FILM COATED ORAL 2 TIMES DAILY
Qty: 180 TABLET | Refills: 1 | Status: SHIPPED | OUTPATIENT
Start: 2025-02-07

## 2025-03-07 ENCOUNTER — OFFICE VISIT (OUTPATIENT)
Dept: FAMILY MEDICINE CLINIC | Facility: CLINIC | Age: 45
End: 2025-03-07
Payer: COMMERCIAL

## 2025-03-07 VITALS
OXYGEN SATURATION: 98 % | TEMPERATURE: 98 F | WEIGHT: 292.9 LBS | DIASTOLIC BLOOD PRESSURE: 84 MMHG | HEART RATE: 68 BPM | BODY MASS INDEX: 45.97 KG/M2 | HEIGHT: 67 IN | SYSTOLIC BLOOD PRESSURE: 122 MMHG

## 2025-03-07 DIAGNOSIS — E66.01 CLASS 3 SEVERE OBESITY DUE TO EXCESS CALORIES WITH SERIOUS COMORBIDITY AND BODY MASS INDEX (BMI) OF 45.0 TO 49.9 IN ADULT: ICD-10-CM

## 2025-03-07 DIAGNOSIS — I10 PRIMARY HYPERTENSION: ICD-10-CM

## 2025-03-07 DIAGNOSIS — L40.9 PSORIASIS: Primary | ICD-10-CM

## 2025-03-07 DIAGNOSIS — E66.813 CLASS 3 SEVERE OBESITY DUE TO EXCESS CALORIES WITH SERIOUS COMORBIDITY AND BODY MASS INDEX (BMI) OF 45.0 TO 49.9 IN ADULT: ICD-10-CM

## 2025-03-07 DIAGNOSIS — J30.2 SEASONAL ALLERGIES: ICD-10-CM

## 2025-03-07 LAB
ANION GAP SERPL CALCULATED.3IONS-SCNC: 7.8 MMOL/L (ref 5–15)
BASOPHILS # BLD AUTO: 0.04 10*3/MM3 (ref 0–0.2)
BASOPHILS NFR BLD AUTO: 0.5 % (ref 0–1.5)
BUN SERPL-MCNC: 12 MG/DL (ref 6–20)
BUN/CREAT SERPL: 19.7 (ref 7–25)
CALCIUM SPEC-SCNC: 9 MG/DL (ref 8.6–10.5)
CHLORIDE SERPL-SCNC: 106 MMOL/L (ref 98–107)
CO2 SERPL-SCNC: 22.2 MMOL/L (ref 22–29)
CREAT SERPL-MCNC: 0.61 MG/DL (ref 0.57–1)
DEPRECATED RDW RBC AUTO: 39.7 FL (ref 37–54)
EGFRCR SERPLBLD CKD-EPI 2021: 113.2 ML/MIN/1.73
EOSINOPHIL # BLD AUTO: 0.41 10*3/MM3 (ref 0–0.4)
EOSINOPHIL NFR BLD AUTO: 5 % (ref 0.3–6.2)
ERYTHROCYTE [DISTWIDTH] IN BLOOD BY AUTOMATED COUNT: 12.2 % (ref 12.3–15.4)
GLUCOSE SERPL-MCNC: 119 MG/DL (ref 65–99)
HCT VFR BLD AUTO: 40.7 % (ref 34–46.6)
HGB BLD-MCNC: 13.9 G/DL (ref 12–15.9)
IMM GRANULOCYTES # BLD AUTO: 0.03 10*3/MM3 (ref 0–0.05)
IMM GRANULOCYTES NFR BLD AUTO: 0.4 % (ref 0–0.5)
LYMPHOCYTES # BLD AUTO: 2.38 10*3/MM3 (ref 0.7–3.1)
LYMPHOCYTES NFR BLD AUTO: 28.8 % (ref 19.6–45.3)
MCH RBC QN AUTO: 30.4 PG (ref 26.6–33)
MCHC RBC AUTO-ENTMCNC: 34.2 G/DL (ref 31.5–35.7)
MCV RBC AUTO: 89.1 FL (ref 79–97)
MONOCYTES # BLD AUTO: 0.55 10*3/MM3 (ref 0.1–0.9)
MONOCYTES NFR BLD AUTO: 6.7 % (ref 5–12)
NEUTROPHILS NFR BLD AUTO: 4.86 10*3/MM3 (ref 1.7–7)
NEUTROPHILS NFR BLD AUTO: 58.6 % (ref 42.7–76)
NRBC BLD AUTO-RTO: 0 /100 WBC (ref 0–0.2)
PLATELET # BLD AUTO: 248 10*3/MM3 (ref 140–450)
PMV BLD AUTO: 12.5 FL (ref 6–12)
POTASSIUM SERPL-SCNC: 4.3 MMOL/L (ref 3.5–5.2)
RBC # BLD AUTO: 4.57 10*6/MM3 (ref 3.77–5.28)
SODIUM SERPL-SCNC: 136 MMOL/L (ref 136–145)
WBC NRBC COR # BLD AUTO: 8.27 10*3/MM3 (ref 3.4–10.8)

## 2025-03-07 PROCEDURE — 80048 BASIC METABOLIC PNL TOTAL CA: CPT

## 2025-03-07 PROCEDURE — 85025 COMPLETE CBC W/AUTO DIFF WBC: CPT

## 2025-03-07 RX ORDER — KETOCONAZOLE 20 MG/ML
SHAMPOO, SUSPENSION TOPICAL 2 TIMES WEEKLY
Qty: 120 ML | Refills: 2 | Status: SHIPPED | OUTPATIENT
Start: 2025-03-10

## 2025-03-07 RX ORDER — CETIRIZINE HYDROCHLORIDE 10 MG/1
10 TABLET ORAL DAILY
Qty: 90 TABLET | Refills: 1 | Status: SHIPPED | OUTPATIENT
Start: 2025-03-07

## 2025-03-07 RX ORDER — CLOBETASOL PROPIONATE 0.05 G/100ML
SHAMPOO TOPICAL
COMMUNITY
Start: 2025-02-08

## 2025-03-07 NOTE — PROGRESS NOTES
Chief Complaint  Chief Complaint   Patient presents with    Hypertension    Psoriasis    Obesity     Pt would like to discuss options for weight loss.       Subjective      Jessenia Platt presents to [facility]  History of Present Illness  The patient presents for evaluation of psoriasis, weight management, and hypertension.    She has been under the care of a dermatologist at [facility], who prescribed Otezla, which she reports as effective in managing her condition. She also uses ketoconazole shampoo for scalp treatment, which she finds beneficial. Additionally, she was given clobetasol shampoo by the dermatologist, which she was supposed to use for 30 days. She requests refills on the ketoconazole shampoo and Zyrtec.    She has expressed interest in weight loss injections and has contacted her insurance provider regarding coverage. She has previously sought treatment at a weight loss clinic, where she was prescribed oral semaglutide. She does not recall any prior use of Wellbutrin or bupropion for depression, anxiety, smoking cessation, headaches, or weight loss. She also does not remember taking phentermine in the past. She had previously been prescribed Topamax, which she reports as having an unusual taste. She was advised to increase the dosage of her medications at the weight loss clinic, but she was unable to do so due to financial constraints as she did not have insurance at that time.    She has a history of hypertension, which is well-managed with lisinopril and HCTZ. She reports no other cardiac issues.        Objective     Medical History:  Past Medical History:   Diagnosis Date    Allergic     Asthma     Fibroid     Hypertension     Obesity 2010     Past Surgical History:   Procedure Laterality Date     SECTION      ENDOMETRIAL ABLATION      FULGURATION ENDOMETRIOSIS N/A 3/15/2024    Procedure: DIAGNOSTIC LAPARASCOPY;  Surgeon: Jolly Brown DO;  Location:   [facility];  Service: Gynecology;  Laterality: N/A;    LAPAROSCOPIC TUBAL LIGATION Bilateral       Social History     Tobacco Use    Smoking status: Former     Current packs/day: 0.00     Average packs/day: 0.1 packs/day for 10.0 years (1.0 ttl pk-yrs)     Types: Cigarettes     Start date: 1992     Quit date:      Years since quittin.1     Passive exposure: Past    Smokeless tobacco: Never   Vaping Use    Vaping status: Never Used   Substance Use Topics    Alcohol use: Not Currently    Drug use: Never     Family History   Problem Relation Age of Onset    Hyperlipidemia Mother     Diabetes Father     Hyperlipidemia Father     Breast cancer Neg Hx     Uterine cancer Neg Hx     Ovarian cancer Neg Hx     Colon cancer Neg Hx     Malig Hyperthermia Neg Hx        Medications:  Prior to Admission medications    Medication Sig Start Date End Date Taking? Authorizing Provider   Apremilast (Otezla) 30 MG tablet Take 30 mg by mouth 2 (Two) Times a Day. 25  Yes Maris Gordillo APRN   cetirizine (zyrTEC) 10 MG tablet Take 1 tablet by mouth Daily. 25  Yes Tameka Thomas MD   clobetasol (TEMOVATE) 0.05 % ointment Apply 1 Application topically to the appropriate area as directed 2 (Two) Times a Day. 12/10/24  Yes Maris Gordillo APRN   clobetasol propionate (CLOBEX) 0.05 % shampoo Apply  topically to the appropriate area as directed. 25  Yes ProviderJu MD   hydroCHLOROthiazide 12.5 MG tablet Take 1 tablet by mouth Daily. 24  Yes Maris Gordillo APRN   ketoconazole (NIZORAL) 2 % shampoo Apply  topically to the appropriate area as directed 2 (Two) Times a Week. 24  Yes Maris Gordillo APRN   lisinopril (PRINIVIL,ZESTRIL) 40 MG tablet Take 1 tablet by mouth Daily. 24  Yes Maris Gordillo APRN   benzonatate (TESSALON) 100 MG capsule Take 1 capsule by mouth 3 (Three) Times a Day As Needed for Cough for up to 30 doses. 25   William  "Tameka Guillory MD   fluticasone (FLONASE) 50 MCG/ACT nasal spray 1 spray by Each Nare route Daily As Needed for Rhinitis or Allergies for up to 30 days. 1/22/25 2/21/25  Tameka Thomas MD        Allergies:   Patient has no known allergies.    Health Maintenance Due   Topic Date Due    COVID-19 Vaccine (3 - 2024-25 season) 09/01/2024         Vital Signs:   /84 (BP Location: Left arm, Patient Position: Sitting, Cuff Size: Adult)   Pulse 68   Temp 98 °F (36.7 °C) (Oral)   Ht 170.2 cm (67\")   Wt 133 kg (292 lb 14.4 oz)   SpO2 98%   BMI 45.87 kg/m²     Wt Readings from Last 3 Encounters:   03/07/25 133 kg (292 lb 14.4 oz)   01/22/25 130 kg (285 lb 14.4 oz)   12/10/24 130 kg (287 lb 1.6 oz)     BP Readings from Last 3 Encounters:   03/07/25 122/84   01/22/25 146/82   12/10/24 122/80       Physical Exam  Vitals reviewed.   Constitutional:       Appearance: Normal appearance. She is well-developed. She is morbidly obese.   HENT:      Head: Normocephalic and atraumatic.   Eyes:      Conjunctiva/sclera: Conjunctivae normal.      Pupils: Pupils are equal, round, and reactive to light.   Cardiovascular:      Rate and Rhythm: Normal rate and regular rhythm.      Heart sounds: No murmur heard.     No friction rub. No gallop.   Pulmonary:      Effort: Pulmonary effort is normal.      Breath sounds: Normal breath sounds. No wheezing or rhonchi.   Abdominal:      General: Bowel sounds are normal. There is no distension.      Palpations: Abdomen is soft.      Tenderness: There is no abdominal tenderness.   Skin:     General: Skin is warm and dry.   Neurological:      Mental Status: She is alert and oriented to person, place, and time.      Cranial Nerves: No cranial nerve deficit.   Psychiatric:         Mood and Affect: Mood and affect normal.         Behavior: Behavior normal.         Thought Content: Thought content normal.         Judgment: Judgment normal.       Physical Exam  Lungs were auscultated.  Heart was " examined.    Vital Signs  Blood pressure is 122/84.      Result Review :    The following data was reviewed by JANET Donahue on 03/07/25 at 07:49 EST:    Common labs          3/22/2024    18:41 4/11/2024    09:04 9/25/2024    10:06   Common Labs   Glucose 157  136  100    BUN 9  11  11    Creatinine 0.63  0.66  0.62    Sodium 139  139  139    Potassium 3.8  4.2  4.0    Chloride 105  104  105    Calcium 9.2  8.9  9.5    Albumin 4.2   4.3    Total Bilirubin 0.4   0.4    Alkaline Phosphatase 82   72    AST (SGOT) 20   22    ALT (SGPT) 32   30    WBC 9.90   7.83    Hemoglobin 14.6   13.9    Hematocrit 43.7   41.7    Platelets 256   244    Total Cholesterol   177    Triglycerides   137    HDL Cholesterol   42    LDL Cholesterol    111    Hemoglobin A1C  5.80  5.30        No Images in the past 120 days found..    Results                 Assessment and Plan    Diagnoses and all orders for this visit:    1. Psoriasis (Primary)  -     ketoconazole (NIZORAL) 2 % shampoo; Apply  topically to the appropriate area as directed 2 (Two) Times a Week.  Dispense: 120 mL; Refill: 2  -     CBC w AUTO Differential  -     Basic metabolic panel    2. Primary hypertension    3. Seasonal allergies  -     cetirizine (zyrTEC) 10 MG tablet; Take 1 tablet by mouth Daily.  Dispense: 90 tablet; Refill: 1    4. Class 3 severe obesity due to excess calories with serious comorbidity and body mass index (BMI) of 45.0 to 49.9 in adult  -     Tirzepatide-Weight Management (ZEPBOUND) 2.5 MG/0.5ML solution auto-injector; Inject 0.5 mL under the skin into the appropriate area as directed 1 (One) Time Per Week.  Dispense: 2 mL; Refill: 0       Assessment & Plan  1. Psoriasis.  Patient has been on maintenance dose of Otezla with significant improvement in psoriatic plaques.  She was referred over to dermatologist for evaluation and per dermatology, they advised to continue current treatment plan.  She also uses ketoconazole shampoo for scalp  treatment, which she finds beneficial. Additionally, she was given clobetasol shampoo by the dermatologist, which she was supposed to use for 30 days. She will continue her current regimen of Otezla. She is advised to alternate between ketoconazole shampoo and clobetasol shampoo. A prescription refill for ketoconazole shampoo and Zyrtec has been provided. Laboratory tests will be conducted today to monitor kidney function while on Otezla.    2. Weight management.  She has expressed interest in weight loss injections and has contacted her insurance provider regarding coverage. She has previously sought treatment at a weight loss clinic, where she was prescribed oral semaglutide. She does not recall any prior use of Wellbutrin or bupropion for depression, anxiety, smoking cessation, headaches, or weight loss. She also does not remember taking phentermine in the past.  She had previously been prescribed Topamax. A request for weight loss injections will be submitted to her insurance provider. If the request is denied, alternative medications such as phentermine, Bupropion or Topamax will be considered. She is advised to maintain a high-fiber diet and consume adequate protein to prevent muscle loss. The potential side effects of weight loss medications, including gastrointestinal symptoms, have been discussed.    3. Hypertension.  Her blood pressure is well-controlled at 122/84. She will continue her current medication regimen of lisinopril 40 mg and HCTZ 12.5 mg. She is advised to have her pharmacy send a refill request when needed.          Smoking Cessation:    Jessenia Platt  reports that she quit smoking about 23 years ago. Her smoking use included cigarettes. She started smoking about 33 years ago. She has a 1 pack-year smoking history. She has been exposed to tobacco smoke. She has never used smokeless tobacco.             Follow Up   Return in about 3 months (around 6/7/2025), or if symptoms worsen or fail to  improve, for Next scheduled follow up.  Patient was given instructions and counseling regarding her condition or for health maintenance advice. Please see specific information pulled into the AVS if appropriate.     Please note that portions of this note were completed with a voice recognition program.    Patient or patient representative verbalized consent for the use of Ambient Listening during the visit with  JANET Donahue for chart documentation. 3/7/2025  07:47 EST

## 2025-03-10 DIAGNOSIS — I10 PRIMARY HYPERTENSION: ICD-10-CM

## 2025-03-11 ENCOUNTER — APPOINTMENT (OUTPATIENT)
Dept: GENERAL RADIOLOGY | Facility: HOSPITAL | Age: 45
End: 2025-03-11
Payer: COMMERCIAL

## 2025-03-11 ENCOUNTER — TELEPHONE (OUTPATIENT)
Dept: FAMILY MEDICINE CLINIC | Facility: CLINIC | Age: 45
End: 2025-03-11
Payer: COMMERCIAL

## 2025-03-11 ENCOUNTER — HOSPITAL ENCOUNTER (EMERGENCY)
Facility: HOSPITAL | Age: 45
Discharge: HOME | End: 2025-03-11
Attending: EMERGENCY MEDICINE | Admitting: EMERGENCY MEDICINE
Payer: COMMERCIAL

## 2025-03-11 VITALS
BODY MASS INDEX: 45.99 KG/M2 | HEART RATE: 65 BPM | DIASTOLIC BLOOD PRESSURE: 87 MMHG | RESPIRATION RATE: 18 BRPM | HEIGHT: 67 IN | WEIGHT: 293 LBS | SYSTOLIC BLOOD PRESSURE: 146 MMHG | TEMPERATURE: 97.3 F | OXYGEN SATURATION: 99 %

## 2025-03-11 DIAGNOSIS — M25.561 ACUTE PAIN OF RIGHT KNEE: ICD-10-CM

## 2025-03-11 DIAGNOSIS — S86.911A KNEE STRAIN, RIGHT, INITIAL ENCOUNTER: Primary | ICD-10-CM

## 2025-03-11 PROCEDURE — 96372 THER/PROPH/DIAG INJ SC/IM: CPT

## 2025-03-11 PROCEDURE — 25010000002 KETOROLAC TROMETHAMINE PER 15 MG

## 2025-03-11 PROCEDURE — 97161 PT EVAL LOW COMPLEX 20 MIN: CPT | Performed by: PHYSICAL THERAPIST

## 2025-03-11 PROCEDURE — 99283 EMERGENCY DEPT VISIT LOW MDM: CPT

## 2025-03-11 PROCEDURE — 97110 THERAPEUTIC EXERCISES: CPT | Performed by: PHYSICAL THERAPIST

## 2025-03-11 PROCEDURE — 73562 X-RAY EXAM OF KNEE 3: CPT

## 2025-03-11 RX ORDER — LISINOPRIL 40 MG/1
40 TABLET ORAL DAILY
Qty: 90 TABLET | Refills: 1 | Status: SHIPPED | OUTPATIENT
Start: 2025-03-11

## 2025-03-11 RX ORDER — HYDROCHLOROTHIAZIDE 12.5 MG/1
12.5 TABLET ORAL DAILY
Qty: 90 TABLET | Refills: 1 | Status: SHIPPED | OUTPATIENT
Start: 2025-03-11

## 2025-03-11 RX ORDER — KETOROLAC TROMETHAMINE 30 MG/ML
30 INJECTION, SOLUTION INTRAMUSCULAR; INTRAVENOUS ONCE
Status: COMPLETED | OUTPATIENT
Start: 2025-03-11 | End: 2025-03-11

## 2025-03-11 RX ADMIN — KETOROLAC TROMETHAMINE 30 MG: 30 INJECTION, SOLUTION INTRAMUSCULAR; INTRAVENOUS at 09:35

## 2025-03-11 NOTE — TELEPHONE ENCOUNTER
Caller: Jessenia Platt    Relationship: Self    Best call back number: [phone]     What medication are you requesting: PAIN MEDICATION    What are your current symptoms: RIGHT MCL STRAIN    How long have you been experiencing symptoms: THIS MORNING AROUND 0530      If a prescription is needed, what is your preferred pharmacy and phone number: [facility] [place] [id number] [place] [facility] [address] [facility] - [phone]  - [phone] FX     Additional notes: PATIENT WAS SEEN AT [facility] THIS AM FOR MCL STRAIN AND WAS GIVEN A BRACE AND A SHOT OF PAIN MEDICATION AT THE HOSPITAL BUT NO MEDICATION TO BRING HOME.    CAN PROVIDER NII PLEASE CALL IN SOMETHING FOR THE PAIN.    NO AVAILABLE APPOINTMENTS IN THE 7 DAY WINDOW PER PROTOCOL TO SCHEDULE, [facility] TRANSFERRED PATIENT TO FRONT OFFICE.

## 2025-03-11 NOTE — THERAPY EVALUATION
Patient Name: Jessenia Platt  : 1980    MRN: [id number]                              Today's Date: 3/11/2025       Admit Date: 3/11/2025    Visit Dx:     ICD-10-CM ICD-9-CM   1. Knee strain, right, initial encounter  S86.911A 844.9   2. Acute pain of right knee  M25.561 719.46     Patient Active Problem List   Diagnosis    Vitamin B12 deficiency    Essential hypertension    Asthma    Allergic rhinitis    Chronic pelvic pain in female     Past Medical History:   Diagnosis Date    Allergic     Asthma     Fibroid     Hypertension     Obesity 2010     Past Surgical History:   Procedure Laterality Date     SECTION      ENDOMETRIAL ABLATION      FULGURATION ENDOMETRIOSIS N/A 3/15/2024    Procedure: DIAGNOSTIC LAPARASCOPY;  Surgeon: Jolly Brown DO;  Location: [facility] MAIN OR;  Service: Gynecology;  Laterality: N/A;    LAPAROSCOPIC TUBAL LIGATION Bilateral       General Information       Row Name 25 1008          Physical Therapy Time and Intention    Document Type evaluation  -LR     Mode of Treatment individual therapy  -LR       Row Name 25 1008          General Information    Patient Profile Reviewed yes  -LR     Prior Level of Function independent:  -LR               User Key  (r) = Recorded By, (t) = Taken By, (c) = Cosigned By      Initials Name Provider Type    LR Ginger Brunner, PT Physical Therapist                  History: Pt reports this morning she fell and her right leg went behind her and sideways and she is now having pain in on the inside of her right knee.  Pt reports she can't weight bear on the right leg.  She has not taken any medicine.    Objective:    Palpation: Tender to palpation at R MCL    ROM:  Active Knee ROM:  L Knee AROM:  R Knee AROM:  Flexion: NT   Flexion: 45°  Extension: NT   Extension: 0°    Passive Knee ROM:  L Knee PROM:  R Knee PROM:  Flexion: NT   Flexion: 60°  Extension: NT   Extension: 0°     Strength:  L Hip MMT:    R Hip MMT:  Flexion: NT   Flexion:  2+    L Knee MMT:   R Knee MMT:  Flexion: NT   Flexion: 4  Extension: NT   Extension: 4+    L Ankle MMT:   R Ankle MMT:  DF: NT    DF: 5  PF: NT    PF: 5     Special Tests:  Valgus Stress Test: positive on R  Varus Stress Test: negative on R  William Test: NT  Patellar Apprehension Test: NT  Anterior Drawer Test: NT (unable to tolerate)  Posterior Drawer Test: NT (unable to tolerate)  Lachman Test: negative on R     Sensation: R LE sensation intact to light touch    Assessment/Plan:   Pt presents with a diagnosis of R knee pain and has signs/symptoms consistent with R MCL sprain with decreased R knee ROM and weakness that are limiting her ability to stand and walk.  The patient was educated in exercises to improve knee ROM and strength.  The patient was placed in a knee immobilizer brace.     Goals:   LTG 1: The patient will be independent in HEP in order to decrease pain and improve tolerance to functional activities.  STATUS: Met    Interventions:   Manual Therapy: not performed    Therapeutic Exercises: HEP: heel slides with sheet, quad set, SAQ, hamstring stretch, gastroc stretch    Modalities: not performed      Outcome Measures       Row Name 03/11/25 1008          Optimal Instrument    Optimal Instrument Optimal - 3  -LR     Standing 3  -LR     Walking - short distance 4  -LR     Walking - long distance 5  -LR     From the list, choose the 3 activities you would most like to be able to do without any difficulty Standing;Walking -short distance;Walking -long distance  -LR     Total Score Optimal - 3 12  -LR       Row Name 03/11/25 1008          Functional Assessment    Outcome Measure Options Optimal Instrument  -LR               User Key  (r) = Recorded By, (t) = Taken By, (c) = Cosigned By      Initials Name Provider Type    Ginger Meraz, PT Physical Therapist                     Time Calculation:   PT Evaluation Complexity  History, PT Evaluation Complexity: 1-2 personal factors and/or  comorbidities  Examination of Body Systems (PT Eval Complexity): 1-2 elements  Clinical Presentation (PT Evaluation Complexity): stable  Clinical Decision Making (PT Evaluation Complexity): low complexity  Overall Complexity (PT Evaluation Complexity): low complexity     PT Charges       Row Name 03/11/25 1014             Time Calculation    PT Received On 03/11/25  -LR         Timed Charges    76117 - PT Therapeutic Exercise Minutes 10  -LR         Untimed Charges    PT Eval/Re-eval Minutes 20  -LR         Total Minutes    Timed Charges Total Minutes 10  -LR      Untimed Charges Total Minutes 20  -LR       Total Minutes 30  -LR                User Key  (r) = Recorded By, (t) = Taken By, (c) = Cosigned By      Initials Name Provider Type    LR Ginger Brunner, PT Physical Therapist                  Therapy Charges for Today       Code Description Service Date Service Provider Modifiers Qty    77525305122 HC PT THER PROC EA 15 MIN 3/11/2025 Ginger Brunner, PT GP 1    74363142460 HC PT EVAL LOW COMPLEXITY 2 3/11/2025 Ginger Brunner, PT GP 1            PT G-Codes  Outcome Measure Options: Optimal Instrument       Gingre Brunner, PT  3/11/2025

## 2025-03-11 NOTE — ED PROVIDER NOTES
Time: 9:00 AM EDT  Date of encounter:  3/11/2025  Independent Historian/Clinical History and Information was obtained by:   Patient    History is limited by: N/A    Chief Complaint: right knee pain       History of Present Illness:  Patient is a 45 y.o. year old female who presents to the emergency department for evaluation of right knee pain secondary to a mechanical fall this morning. Pt denies prior knee surgical history      Patient Care Team  Primary Care Provider: Maris Gordillo APRN    Past Medical History:     No Known Allergies  Past Medical History:   Diagnosis Date    Allergic     Asthma     Fibroid     Hypertension     Obesity 2010     Past Surgical History:   Procedure Laterality Date     SECTION      ENDOMETRIAL ABLATION      FULGURATION ENDOMETRIOSIS N/A 3/15/2024    Procedure: DIAGNOSTIC LAPARASCOPY;  Surgeon: Jolly Brown DO;  Location: [facility] MAIN OR;  Service: Gynecology;  Laterality: N/A;    LAPAROSCOPIC TUBAL LIGATION Bilateral      Family History   Problem Relation Age of Onset    Hyperlipidemia Mother     Diabetes Father     Hyperlipidemia Father     Breast cancer Neg Hx     Uterine cancer Neg Hx     Ovarian cancer Neg Hx     Colon cancer Neg Hx     Malig Hyperthermia Neg Hx        Home Medications:  Prior to Admission medications    Medication Sig Start Date End Date Taking? Authorizing Provider   Apremilast (Otezla) 30 MG tablet Take 30 mg by mouth 2 (Two) Times a Day. 25   Maris Gordillo APRN   cetirizine (zyrTEC) 10 MG tablet Take 1 tablet by mouth Daily. 3/7/25   Maris Gordillo APRN   clobetasol (TEMOVATE) 0.05 % ointment Apply 1 Application topically to the appropriate area as directed 2 (Two) Times a Day. 12/10/24   Maris Gordillo APRN   clobetasol propionate (CLOBEX) 0.05 % shampoo Apply  topically to the appropriate area as directed. 25   Provider, MD Ju   fluticasone (FLONASE) 50 MCG/ACT nasal spray 1 spray by Each Nare  route Daily As Needed for Rhinitis or Allergies for up to 30 days. 25  Tamkea Thomas MD   hydroCHLOROthiazide 12.5 MG tablet Take 1 tablet by mouth Daily. 3/11/25   Maris Gordillo APRN   ketoconazole (NIZORAL) 2 % shampoo Apply  topically to the appropriate area as directed 2 (Two) Times a Week. 3/10/25   Maris Gordillo APRN   lisinopril (PRINIVIL,ZESTRIL) 40 MG tablet Take 1 tablet by mouth Daily. 3/11/25   Maris Gordillo APRN   Tirzepatide-Weight Management (ZEPBOUND) 2.5 MG/0.5ML solution auto-injector Inject 0.5 mL under the skin into the appropriate area as directed 1 (One) Time Per Week. 3/7/25   Maris Gordillo APRN        Social History:   Social History     Tobacco Use    Smoking status: Former     Current packs/day: 0.00     Average packs/day: 0.1 packs/day for 10.0 years (1.0 ttl pk-yrs)     Types: Cigarettes     Start date: 1992     Quit date:      Years since quittin.2     Passive exposure: Past    Smokeless tobacco: Never   Vaping Use    Vaping status: Never Used   Substance Use Topics    Alcohol use: Not Currently    Drug use: Never         Review of Systems:  Review of Systems   Constitutional:  Negative for chills and fever.   HENT:  Negative for congestion, rhinorrhea and sore throat.    Eyes:  Negative for pain and visual disturbance.   Respiratory:  Negative for apnea, cough, chest tightness and shortness of breath.    Cardiovascular:  Negative for chest pain and palpitations.   Gastrointestinal:  Negative for abdominal pain, diarrhea, nausea and vomiting.   Genitourinary:  Negative for difficulty urinating and dysuria.   Musculoskeletal:  Positive for arthralgias. Negative for joint swelling and myalgias.   Skin:  Negative for color change.   Neurological:  Negative for seizures and headaches.   Psychiatric/Behavioral: Negative.     All other systems reviewed and are negative.       Physical Exam:  /88 (BP Location: Right  "arm, Patient Position: Sitting)   Pulse 63   Temp 97.3 °F (36.3 °C) (Oral)   Resp 17   Ht 170.2 cm (67\")   Wt 133 kg (293 lb 3.4 oz)   SpO2 98%   BMI 45.92 kg/m²     Physical Exam  Vitals and nursing note reviewed.   Constitutional:       General: She is not in acute distress.     Appearance: Normal appearance. She is not toxic-appearing.   HENT:      Head: Normocephalic and atraumatic.      Jaw: There is normal jaw occlusion.   Eyes:      General: Lids are normal.      Extraocular Movements: Extraocular movements intact.      Conjunctiva/sclera: Conjunctivae normal.      Pupils: Pupils are equal, round, and reactive to light.   Cardiovascular:      Rate and Rhythm: Normal rate and regular rhythm.      Pulses: Normal pulses.      Heart sounds: Normal heart sounds.   Pulmonary:      Effort: Pulmonary effort is normal. No respiratory distress.      Breath sounds: Normal breath sounds. No wheezing or rhonchi.   Abdominal:      General: Abdomen is flat.      Palpations: Abdomen is soft.      Tenderness: There is no abdominal tenderness. There is no guarding or rebound.   Musculoskeletal:         General: Tenderness (mild right knee) present. Normal range of motion.      Cervical back: Normal range of motion and neck supple.      Right lower leg: No edema.      Left lower leg: No edema.   Skin:     General: Skin is warm and dry.   Neurological:      Mental Status: She is alert and oriented to person, place, and time. Mental status is at baseline.   Psychiatric:         Mood and Affect: Mood normal.                    Medical Decision Making:      Comorbidities that affect care:    Asthma, Hypertension    External Notes reviewed:          The following orders were placed and all results were independently analyzed by me:  Orders Placed This Encounter   Procedures    [facility] 03. Shoulder Immobilzer/Sling (); Right    XR Knee 3 View Right       Medications Given in the Emergency Department:  Medications "   ketorolac (TORADOL) injection 30 mg (has no administration in time range)        ED Course:         Labs:    Lab Results (last 24 hours)       ** No results found for the last 24 hours. **             Imaging:    XR Knee 3 View Right  Result Date: 3/11/2025  XR KNEE 3 VW RIGHT Date of Exam: 3/11/2025 8:29 AM EDT Indication: fall Comparison: None available. Findings: No right knee fracture or joint malalignment or joint effusion or significant osteoarthritic changes identified. No retained radiopaque foreign body is seen within the soft tissues.     Impression: No acute right knee finding. Electronically Signed: Shaunna Stearns MD  3/11/2025 8:42 AM EDT  Workstation ID: [id number]        Differential Diagnosis and Discussion:    Extremity Pain: Differential diagnosis includes but is not limited to soft tissue sprain, tendonitis, tendon injury, dislocation, fracture, deep vein thrombosis, arterial insufficiency, osteoarthritis, bursitis, and ligamentous damage.    PROCEDURES:        No orders to display       Procedures    MDM       Xray right knee shows no acute finding.  I will provide an ambulatory referral to orthopedic surgery for follow-up.  I instructed patient to return to ED if she develops any new or worsening symptoms.  Patient states she understands and agrees with plan of care.              Patient Care Considerations:          Consultants/Shared Management Plan:    None    Social Determinants of Health:    Patient is independent, reliable, and has access to care.       Disposition and Care Coordination:    Discharged: The patient is suitable and stable for discharge with no need for consideration of admission.    I have explained the patient´s condition, diagnoses and treatment plan based on the information available to me at this time. I have answered questions and addressed any concerns. The patient has a good  understanding of the patient´s diagnosis, condition, and treatment plan as can be expected at  this point. The vital signs have been stable. The patient´s condition is stable and appropriate for discharge from the emergency department.      The patient will pursue further outpatient evaluation with the primary care physician or other designated or consulting physician as outlined in the discharge instructions. They are agreeable to this plan of care and follow-up instructions have been explained in detail. The patient has received these instructions in written format and has expressed an understanding of the discharge instructions. The patient is aware that any significant change in condition or worsening of symptoms should prompt an immediate return to this or the closest emergency department or call to [facility].  I have explained discharge medications and the need for follow up with the patient/caretakers. This was also printed in the discharge instructions. Patient was discharged with the following medications and follow up:      Where to Get Your Medications        These medications were sent to [address] [phone] [facility] [phone] [address] [address]      Phone: [phone]   hydroCHLOROthiazide 12.5 MG tablet  lisinopril 40 MG tablet          Medication List      No changes were made to your prescriptions during this visit.      Maris Gordillo, APRN  [address] [facility] [address]  [facility] [address] [phone]    Call in 1 day  to schedule follow up       Final diagnoses:   Knee strain, right, initial encounter        ED Disposition       ED Disposition   Discharge    Condition   Stable    Comment   --               This medical record created using voice recognition software.             Gianfranco Davis PA-C  03/11/25 0931

## 2025-03-12 ENCOUNTER — TELEPHONE (OUTPATIENT)
Dept: FAMILY MEDICINE CLINIC | Facility: CLINIC | Age: 45
End: 2025-03-12
Payer: COMMERCIAL

## 2025-03-12 DIAGNOSIS — S89.90XA KNEE INJURY, UNSPECIFIED LATERALITY, INITIAL ENCOUNTER: Primary | ICD-10-CM

## 2025-03-12 RX ORDER — KETOROLAC TROMETHAMINE 10 MG/1
10 TABLET, FILM COATED ORAL EVERY 6 HOURS PRN
Qty: 20 TABLET | Refills: 0 | Status: SHIPPED | OUTPATIENT
Start: 2025-03-12

## 2025-03-12 NOTE — TELEPHONE ENCOUNTER
Called and spoke with pt, informed pt that pain meds have been sent to her pharmacy to get her to her appointment on March 20 at 2:15.

## 2025-03-12 NOTE — TELEPHONE ENCOUNTER
Attempted to contact pt to relay that PCP will be sending pain med to assist her until her FU visit on Mar 20,25

## 2025-03-20 ENCOUNTER — OFFICE VISIT (OUTPATIENT)
Dept: FAMILY MEDICINE CLINIC | Facility: CLINIC | Age: 45
End: 2025-03-20
Payer: COMMERCIAL

## 2025-03-20 ENCOUNTER — PATIENT MESSAGE (OUTPATIENT)
Dept: FAMILY MEDICINE CLINIC | Facility: CLINIC | Age: 45
End: 2025-03-20

## 2025-03-20 ENCOUNTER — PRIOR AUTHORIZATION (OUTPATIENT)
Dept: FAMILY MEDICINE CLINIC | Facility: CLINIC | Age: 45
End: 2025-03-20

## 2025-03-20 VITALS
TEMPERATURE: 98.3 F | DIASTOLIC BLOOD PRESSURE: 90 MMHG | HEIGHT: 67 IN | BODY MASS INDEX: 45.99 KG/M2 | OXYGEN SATURATION: 98 % | WEIGHT: 293 LBS | SYSTOLIC BLOOD PRESSURE: 148 MMHG | HEART RATE: 75 BPM

## 2025-03-20 DIAGNOSIS — M23.8X1 LAXITY OF KNEE JOINT, RIGHT: ICD-10-CM

## 2025-03-20 DIAGNOSIS — R29.898 WEAKNESS OF RIGHT LEG: ICD-10-CM

## 2025-03-20 DIAGNOSIS — M25.561 ACUTE PAIN OF RIGHT KNEE: Primary | ICD-10-CM

## 2025-03-20 PROCEDURE — 99214 OFFICE O/P EST MOD 30 MIN: CPT

## 2025-03-20 RX ORDER — HYDROCODONE BITARTRATE AND ACETAMINOPHEN 5; 325 MG/1; MG/1
1 TABLET ORAL EVERY 8 HOURS PRN
Qty: 12 TABLET | Refills: 0 | Status: SHIPPED | OUTPATIENT
Start: 2025-03-20

## 2025-03-20 NOTE — PROGRESS NOTES
Chief Complaint  Chief Complaint   Patient presents with    Knee Pain     Pt was seen at the ER on 25 for right knee strain and pain after a fall.       Subjective      Jessenia Platt presents to [facility]  History of Present Illness  The patient presents for evaluation of right knee pain.    She reports intermittent sharp pain in her right knee, which is exacerbated by prolonged standing and knee flexion. The pain was severe enough to cause a fall, during which she landed on the affected knee. She has been unable to bear weight on the knee since the incident. Despite the absence of swelling, she experiences localized pain around the ACL region and posterior aspect of the knee. Her mobility is limited, with certain movements causing discomfort and a sensation of heaviness in the knee. She has been managing the pain with a brace for the past week, which unfortunately does not provide adequate support as it tends to slip off. She has been managing the pain with Tylenol and ibuprofen, and received a Toradol injection last week. She expresses interest in trying Norco for nighttime use.    MEDICATIONS  Current: Tylenol, ibuprofen, Toradol      Objective     Medical History:  Past Medical History:   Diagnosis Date    Allergic     Asthma     Fibroid     Hypertension     Obesity 2010     Past Surgical History:   Procedure Laterality Date     SECTION      ENDOMETRIAL ABLATION      FULGURATION ENDOMETRIOSIS N/A 3/15/2024    Procedure: DIAGNOSTIC LAPARASCOPY;  Surgeon: Jolly Brown DO;  Location: [facility];  Service: Gynecology;  Laterality: N/A;    LAPAROSCOPIC TUBAL LIGATION Bilateral       Social History     Tobacco Use    Smoking status: Former     Current packs/day: 0.00     Average packs/day: 0.1 packs/day for 10.0 years (1.0 ttl pk-yrs)     Types: Cigarettes     Start date: 1992     Quit date:      Years since quittin.2     Passive exposure: Past     Smokeless tobacco: Never   Vaping Use    Vaping status: Never Used   Substance Use Topics    Alcohol use: Not Currently    Drug use: Never     Family History   Problem Relation Age of Onset    Hyperlipidemia Mother     Diabetes Father     Hyperlipidemia Father     Breast cancer Neg Hx     Uterine cancer Neg Hx     Ovarian cancer Neg Hx     Colon cancer Neg Hx     Malig Hyperthermia Neg Hx        Medications:  Prior to Admission medications    Medication Sig Start Date End Date Taking? Authorizing Provider   Apremilast (Otezla) 30 MG tablet Take 30 mg by mouth 2 (Two) Times a Day. 2/7/25   Maris Gordillo APRN   cetirizine (zyrTEC) 10 MG tablet Take 1 tablet by mouth Daily. 3/7/25   Maris Gordillo APRN   clobetasol (TEMOVATE) 0.05 % ointment Apply 1 Application topically to the appropriate area as directed 2 (Two) Times a Day. 12/10/24   Maris Gordillo APRN   clobetasol propionate (CLOBEX) 0.05 % shampoo Apply  topically to the appropriate area as directed. 2/8/25   ProviderJu MD   fluticasone (FLONASE) 50 MCG/ACT nasal spray 1 spray by Each Nare route Daily As Needed for Rhinitis or Allergies for up to 30 days. 1/22/25 2/21/25  Tameka Thomas MD   hydroCHLOROthiazide 12.5 MG tablet Take 1 tablet by mouth Daily. 3/11/25   Maris Gordillo APRN   ketoconazole (NIZORAL) 2 % shampoo Apply  topically to the appropriate area as directed 2 (Two) Times a Week. 3/10/25   Maris Gordillo APRN   ketorolac (TORADOL) 10 MG tablet Take 1 tablet by mouth Every 6 (Six) Hours As Needed for Moderate Pain. 3/12/25   Maris Gordillo APRN   lisinopril (PRINIVIL,ZESTRIL) 40 MG tablet Take 1 tablet by mouth Daily. 3/11/25   Maris Gordillo APRN   Tirzepatide-Weight Management (ZEPBOUND) 2.5 MG/0.5ML solution auto-injector Inject 0.5 mL under the skin into the appropriate area as directed 1 (One) Time Per Week. 3/7/25   Maris Gordillo, JANET        Allergies:  "  Patient has no known allergies.    Health Maintenance Due   Topic Date Due    COVID-19 Vaccine (3 - 2024-25 season) 09/01/2024         Vital Signs:   /90 (BP Location: Right arm, Patient Position: Sitting, Cuff Size: Adult)   Pulse 75   Temp 98.3 °F (36.8 °C) (Oral)   Ht 170.2 cm (67\")   Wt 133 kg (293 lb 8 oz)   SpO2 98%   BMI 45.97 kg/m²     Wt Readings from Last 3 Encounters:   03/20/25 133 kg (293 lb 8 oz)   03/11/25 133 kg (293 lb 3.4 oz)   03/07/25 133 kg (292 lb 14.4 oz)     BP Readings from Last 3 Encounters:   03/20/25 148/90   03/11/25 146/87   03/07/25 122/84       Physical Exam  Vitals reviewed.   Constitutional:       Appearance: Normal appearance. She is well-developed.   HENT:      Head: Normocephalic and atraumatic.   Eyes:      Conjunctiva/sclera: Conjunctivae normal.      Pupils: Pupils are equal, round, and reactive to light.   Cardiovascular:      Rate and Rhythm: Normal rate and regular rhythm.   Pulmonary:      Effort: Pulmonary effort is normal.      Breath sounds: Normal breath sounds.   Musculoskeletal:      Right knee: No swelling, erythema or ecchymosis. Decreased range of motion. Tenderness present over the medial joint line and MCL.      Instability Tests: Medial William test positive and lateral William test positive.   Skin:     General: Skin is warm and dry.   Neurological:      Mental Status: She is alert and oriented to person, place, and time.      Cranial Nerves: No cranial nerve deficit.   Psychiatric:         Mood and Affect: Mood and affect normal.         Behavior: Behavior normal.         Thought Content: Thought content normal.         Judgment: Judgment normal.       Physical Exam  There is no swelling in the right knee.      Result Review :    The following data was reviewed by JANET Donahue on 03/20/25 at 15:07 EDT:        XR Knee 3 View Right  Result Date: 3/11/2025  Impression: No acute right knee finding. Electronically Signed: Shaunna" MD Daryn  3/11/2025 8:42 AM EDT  Workstation ID: [id number]      Results  Imaging  X-ray of the knee was normal.               Assessment and Plan    Diagnoses and all orders for this visit:    1. Acute pain of right knee (Primary)  -     MRI Knee Right Without Contrast; Future  -     HYDROcodone-acetaminophen (NORCO) 5-325 MG per tablet; Take 1 tablet by mouth Every 8 (Eight) Hours As Needed for Moderate Pain.  Dispense: 12 tablet; Refill: 0    2. Weakness of right leg  -     MRI Knee Right Without Contrast; Future  -     HYDROcodone-acetaminophen (NORCO) 5-325 MG per tablet; Take 1 tablet by mouth Every 8 (Eight) Hours As Needed for Moderate Pain.  Dispense: 12 tablet; Refill: 0    3. Laxity of knee joint, right       Assessment & Plan  1. Right knee pain.  The patient reports sharp, intermittent pain in the right knee, exacerbated by bending and weight-bearing activities. She experienced a fall due to the knee giving out, landing directly on the knee. An x-ray was previously performed and returned normal results. A stat MRI has been ordered to further investigate the cause of the pain. A prescription for Norco has been provided for pain management, to be taken at night due to its sedative effects. She is advised to continue using ice and elevation for the knee and to alternate between Tylenol and ibuprofen throughout the day. A new knee brace will be fitted to provide better stability.          Smoking Cessation:    Jessenia Platt  reports that she quit smoking about 23 years ago. Her smoking use included cigarettes. She started smoking about 33 years ago. She has a 1 pack-year smoking history. She has been exposed to tobacco smoke. She has never used smokeless tobacco.       Follow Up   Return if symptoms worsen or fail to improve.  Patient was given instructions and counseling regarding her condition or for health maintenance advice. Please see specific information pulled into the AVS if appropriate.      Please note that portions of this note were completed with a voice recognition program.    Patient or patient representative verbalized consent for the use of Ambient Listening during the visit with  JANET Donahue for chart documentation. 3/20/2025  15:05 EDT

## 2025-03-20 NOTE — TELEPHONE ENCOUNTER
Zepbound 2.5MG/0.5ML pen-injectors PA DENIAL     Information regarding your request  This request cannot be processed due to the medication is not covered by the plan.

## 2025-03-25 DIAGNOSIS — R29.898 WEAKNESS OF RIGHT LEG: ICD-10-CM

## 2025-03-25 DIAGNOSIS — M23.8X1 LAXITY OF KNEE JOINT, RIGHT: ICD-10-CM

## 2025-03-25 DIAGNOSIS — M25.561 ACUTE PAIN OF RIGHT KNEE: Primary | ICD-10-CM

## 2025-03-26 ENCOUNTER — HOSPITAL ENCOUNTER (OUTPATIENT)
Facility: HOSPITAL | Age: 45
Discharge: HOME | End: 2025-03-26
Payer: COMMERCIAL

## 2025-03-26 ENCOUNTER — OFFICE VISIT (OUTPATIENT)
Dept: OBSTETRICS AND GYNECOLOGY | Facility: CLINIC | Age: 45
End: 2025-03-26
Payer: COMMERCIAL

## 2025-03-26 VITALS — BODY MASS INDEX: 46.52 KG/M2 | WEIGHT: 293 LBS

## 2025-03-26 DIAGNOSIS — Z01.419 WELL WOMAN EXAM: Primary | ICD-10-CM

## 2025-03-26 DIAGNOSIS — R29.898 WEAKNESS OF RIGHT LEG: ICD-10-CM

## 2025-03-26 DIAGNOSIS — M25.561 ACUTE PAIN OF RIGHT KNEE: ICD-10-CM

## 2025-03-26 DIAGNOSIS — M23.8X1 LAXITY OF KNEE JOINT, RIGHT: ICD-10-CM

## 2025-03-26 PROCEDURE — 73562 X-RAY EXAM OF KNEE 3: CPT

## 2025-03-26 NOTE — PROGRESS NOTES
Well Woman Visit    CC: Scheduled annual well gyn visit  Chief Complaint   Patient presents with    Annual Exam         HPI:   45 y.o. who presents today for annual exam. Patient states she has not had any bleeding since endometrial ablation.  She is sexually active with one male partner. She denies any H/O STDS.  She denies any pain with intercourse. She denies any family H/O breast, uterine or ovarian cancer. She denies any vaginal odor, vaginal discharge, dysuria/hematuria, F/C, D/C, N/V, CP or SOB. Patient reports that she is not currently experiencing any symptoms of urinary incontinence.      History: PMHx, Meds, Allergies, PSHx, Social Hx, and POBHx all reviewed and updated.    ROS:  Review of Systems - General ROS: negative  Psychological ROS: negative  Endocrine ROS: negative  Breast ROS: negative  Respiratory ROS: no cough, shortness of breath, or wheezing  Cardiovascular ROS: negative  Gastrointestinal ROS: negative  Genito-Urinary ROS: negative  Musculoskeletal ROS: negative  Neurological ROS: negative  Dermatological ROS: negative        PHYSICAL EXAM:      Wt 135 kg (297 lb)   LMP  (LMP Unknown) Comment: no periods with ablation  BMI 46.52 kg/m²  Not found.    General- NAD, alert and oriented, appropriate  Psych- Normal mood, good memory  Neck- No masses, no thyroid enlargement  CV- Regular rhythm, no murnurs  Resp- CTA to bases, no wheezes  Abdomen- Soft, non distended, non tender, no masses    Breast Exam: Breast self awareness counseled  Breast left-  Bilaterally symmetrical, no masses, non tender, no nipple discharge  Breast right- Bilaterally symmetrical, no masses, non tender, no nipple discharge    Pelvic Exam:   External genitalia- Normal female, no lesions  Urethra/meatus- Normal, no masses, non tender  Bladder- Normal, no masses, non tender  Vagina- Normal, no atrophy, no lesions, no discharge.    Cvx- Normal, no lesions, no discharge, No cervical motion tenderness  Uterus- Normal  size, shape & consistency.  Non tender, mobile.  Adnexa- No mass, non tender    Chaperone present for pelvic exam       Lymphatic- No palpable neck, axillary, or groin nodes  Ext- No edema, no cyanosis    Skin- No lesions, no rashes, no acanthosis nigricans      ASSESSMENT and PLAN:    Diagnoses and all orders for this visit:    1. Well woman exam (Primary)  -     Mammo Screening Digital Tomosynthesis Bilateral With CAD; Future        Preventative:  1. Annuals every year; Cytology collections per prevailing guidelines.   2. Mammograms begin every year at 39 yo if no abnormalities are found and no family history.  3. Bone density studies begin at 66 yo. If no fracture history or osteoporosis family history.  4. Colonoscopy begins at 44 yo. Repeat every ten years if negative and no family history.  5. Calcium of 1796-2262 mg/day in split dosing  6. Vitamin D 400-800 IU/day  7. All other preventative health recommendations will be managed by the patients Primary care physician.    She understands the importance of having any ordered tests to be performed in a timely fashion.  The risks of not performing them include, but are not limited to, advanced cancer stages, bone loss from osteoporosis and/or subsequent increase in morbidity and/or mortality.  She is encouraged to review her results online and/or contact or office if she has questions.     Follow Up:  Return in about 1 year (around 3/26/2026) for Annual exam.    I spent 20 minutes on the separately reported service of Annual. This time is not included in the time used to support the E/M service also reported today.        Jolly Brown DO  03/26/2025    [facility]  [address] [place] [address]  Dept: [phone]  Dept Fax: [phone]  Loc: [phone]  Loc Fax: [phone]

## 2025-04-10 DIAGNOSIS — M25.561 ACUTE PAIN OF RIGHT KNEE: Primary | ICD-10-CM

## 2025-04-10 DIAGNOSIS — M23.8X1 LAXITY OF KNEE JOINT, RIGHT: ICD-10-CM

## 2025-04-10 DIAGNOSIS — R29.898 WEAKNESS OF RIGHT LEG: ICD-10-CM

## 2025-04-16 ENCOUNTER — OFFICE VISIT (OUTPATIENT)
Dept: ORTHOPEDIC SURGERY | Facility: CLINIC | Age: 45
End: 2025-04-16
Payer: COMMERCIAL

## 2025-04-16 VITALS
OXYGEN SATURATION: 95 % | HEART RATE: 75 BPM | BODY MASS INDEX: 45.99 KG/M2 | WEIGHT: 293 LBS | SYSTOLIC BLOOD PRESSURE: 145 MMHG | HEIGHT: 67 IN | DIASTOLIC BLOOD PRESSURE: 90 MMHG

## 2025-04-16 DIAGNOSIS — S89.91XA INJURY OF RIGHT KNEE, INITIAL ENCOUNTER: Primary | ICD-10-CM

## 2025-04-16 NOTE — PROGRESS NOTES
"Chief Complaint  Pain and Initial Evaluation of the Right Knee    Subjective          Jessenia Platt presents to [facility] for   History of Present Illness    Jessenia presents today for evaluation of her right knee.  She had a right knee injury on 3/11.  She had pain and felt like the knee gave out.  She now has anterior and medial pain.  She feels her motion and ability to bear weight is improving.  No prior knee trauma or knee surgeries per her report.      No Known Allergies     Social History     Socioeconomic History    Marital status:    Tobacco Use    Smoking status: Former     Current packs/day: 0.00     Average packs/day: 0.1 packs/day for 10.0 years (1.0 ttl pk-yrs)     Types: Cigarettes     Start date: 1992     Quit date:      Years since quittin.3     Passive exposure: Past    Smokeless tobacco: Never   Vaping Use    Vaping status: Never Used   Substance and Sexual Activity    Alcohol use: Not Currently    Drug use: Never    Sexual activity: Not Currently     Partners: Male        I reviewed the patient's chief complaint, history of present illness, review of systems, past medical history, surgical history, family history, social history, medications, and allergy list.     REVIEW OF SYSTEMS    Constitutional: Denies fevers, chills, weight loss  Cardiovascular: Denies chest pain, shortness of breath  Skin: Denies rashes, acute skin changes  Neurologic: Denies headache, loss of consciousness  MSK: Right knee pain      Objective   Vital Signs:   /90   Pulse 75   Ht 170.2 cm (67\")   Wt 135 kg (297 lb)   SpO2 95%   BMI 46.52 kg/m²     Body mass index is 46.52 kg/m².    Physical Exam    General: Alert. No acute distress.   Right lower extremity: No effusion.  Full extension.  110 degrees of flexion.  Medial joint tenderness.  Pain with William.  Knee stable to varus, valgus, Lachman, posterior drawer.  Calf nontender.  No pain with motion.  Distal " neurovascular intact.    Procedures    Imaging Results (Most Recent)       None                     Assessment and Plan        XR Knee 3 View Right  Result Date: 3/29/2025  Narrative: XR KNEE 3 VW RIGHT Date of Exam: 3/26/2025 10:53 AM EDT Indication: right knee pain, laxity Comparison: 3/11/2025 Findings: No fractures, dislocations or acute osseous abnormalities are identified. There are no joint effusions. No evidence of significant joint space narrowing.     Impression: Impression: Normal exam. Electronically Signed: Maximiliano Adler MD  3/29/2025 3:06 PM EDT  Workstation ID: [id number]       Diagnoses and all orders for this visit:    1. Injury of right knee, initial encounter (Primary)  -     MRI Knee Right Without Contrast; Future        We reviewed her imaging.  We discussed additional evaluation with an MRI given her pain and positive William on exam.  She will continue her brace as needed.  We discussed home range of motion and quad strengthening exercises.  We discussed oral anti-inflammatories as able.  We discussed ice and elevation.  Follow-up after MRI results to discuss treatment options.      Call or return if worsening symptoms.    Scribed for Jona Garcia MD by Pierre Arias  04/16/2025   13:57 EDT         Follow Up       MRI result    Patient was given instructions and counseling regarding her condition or for health maintenance advice. Please see specific information pulled into the AVS if appropriate.     I have personally performed the services described in this document as scribed by the above individual and it is both accurate and complete. Jona Garcia MD 04/16/25 15:32 EDT

## 2025-05-06 ENCOUNTER — HOSPITAL ENCOUNTER (OUTPATIENT)
Dept: MRI IMAGING | Facility: HOSPITAL | Age: 45
Discharge: HOME | End: 2025-05-06
Admitting: STUDENT IN AN ORGANIZED HEALTH CARE EDUCATION/TRAINING PROGRAM
Payer: COMMERCIAL

## 2025-05-06 DIAGNOSIS — L40.9 PSORIASIS: ICD-10-CM

## 2025-05-06 DIAGNOSIS — S89.91XA INJURY OF RIGHT KNEE, INITIAL ENCOUNTER: ICD-10-CM

## 2025-05-06 DIAGNOSIS — I10 PRIMARY HYPERTENSION: ICD-10-CM

## 2025-05-06 DIAGNOSIS — J30.2 SEASONAL ALLERGIES: ICD-10-CM

## 2025-05-06 PROCEDURE — 73721 MRI JNT OF LWR EXTRE W/O DYE: CPT

## 2025-05-07 RX ORDER — HYDROCHLOROTHIAZIDE 12.5 MG/1
12.5 TABLET ORAL DAILY
Qty: 90 TABLET | Refills: 1 | Status: SHIPPED | OUTPATIENT
Start: 2025-05-07

## 2025-05-07 RX ORDER — CETIRIZINE HYDROCHLORIDE 10 MG/1
10 TABLET ORAL DAILY
Qty: 90 TABLET | Refills: 1 | Status: SHIPPED | OUTPATIENT
Start: 2025-05-07

## 2025-05-07 RX ORDER — LISINOPRIL 40 MG/1
40 TABLET ORAL DAILY
Qty: 90 TABLET | Refills: 1 | Status: SHIPPED | OUTPATIENT
Start: 2025-05-07

## 2025-05-07 RX ORDER — CLOBETASOL PROPIONATE 0.5 MG/G
1 OINTMENT TOPICAL 2 TIMES DAILY
Qty: 60 G | Refills: 1 | Status: SHIPPED | OUTPATIENT
Start: 2025-05-07

## 2025-05-07 RX ORDER — KETOCONAZOLE 20 MG/ML
SHAMPOO, SUSPENSION TOPICAL 2 TIMES WEEKLY
Qty: 120 ML | Refills: 2 | Status: SHIPPED | OUTPATIENT
Start: 2025-05-08

## 2025-05-09 ENCOUNTER — TELEPHONE (OUTPATIENT)
Dept: ORTHOPEDIC SURGERY | Facility: CLINIC | Age: 45
End: 2025-05-09
Payer: COMMERCIAL

## 2025-08-13 ENCOUNTER — PRIOR AUTHORIZATION (OUTPATIENT)
Dept: FAMILY MEDICINE CLINIC | Facility: CLINIC | Age: 45
End: 2025-08-13
Payer: COMMERCIAL

## (undated) DEVICE — SOL IRR H2O BG 1000ML STRL

## (undated) DEVICE — KIT,ANTI FOG,W/SPONGE & FLUID,SOFT PACK: Brand: MEDLINE

## (undated) DEVICE — GLV SURG BIOGEL LTX PF 6 1/2

## (undated) DEVICE — 40585 XL ADVANCED TRENDELENBURG POSITIONING KIT: Brand: 40585 XL ADVANCED TRENDELENBURG POSITIONING KIT

## (undated) DEVICE — PAD GRND REM POLYHESIVE A/ DISP

## (undated) DEVICE — 1200 DOUBLE MAGNET NEEDLE COUNTER,BLACK: Brand: DEVON

## (undated) DEVICE — APPL CHLORAPREP 26ML CLR

## (undated) DEVICE — INTENDED FOR TISSUE SEPARATION, AND OTHER PROCEDURES THAT REQUIRE A SHARP SURGICAL BLADE TO PUNCTURE OR CUT.: Brand: BARD-PARKER ® CARBON RIB-BACK BLADES

## (undated) DEVICE — ENDOPATH XCEL BLADELESS TROCARS WITH STABILITY SLEEVES: Brand: ENDOPATH XCEL

## (undated) DEVICE — LAPAROSCOPIC TROCAR SLEEVE/SINGLE USE: Brand: KII® OPTICAL ACCESS SYSTEM

## (undated) DEVICE — NDL HYPO ECLPS SFTY 22G 1 1/2IN

## (undated) DEVICE — DUAL LUMEN STOMACH TUBE: Brand: SALEM SUMP

## (undated) DEVICE — APPL CHLORAPREP HI/LITE 26ML ORNG

## (undated) DEVICE — SKIN PREP TRAY W/CHG: Brand: MEDLINE INDUSTRIES, INC.

## (undated) DEVICE — SLV SCD KN/LEN ADJ EXPRSS BLENDED MD 1P/U

## (undated) DEVICE — ADHS SKIN SURG TISS VISC PREMIERPRO EXOFIN HI/VISC FAST/DRY

## (undated) DEVICE — ANTIBACTERIAL UNDYED BRAIDED (POLYGLACTIN 910), SYNTHETIC ABSORBABLE SUTURE: Brand: COATED VICRYL

## (undated) DEVICE — TROCAR: Brand: KII® SLEEVE

## (undated) DEVICE — SHEET,DRAPE,70X85,STERILE: Brand: MEDLINE

## (undated) DEVICE — LITHOTOMY-YELLOW FINS: Brand: MEDLINE INDUSTRIES, INC.

## (undated) DEVICE — SYR LL TP 10ML STRL

## (undated) DEVICE — SOL IRR NACL 0.9PCT 1000ML